# Patient Record
Sex: MALE | Race: WHITE | NOT HISPANIC OR LATINO | Employment: OTHER | URBAN - METROPOLITAN AREA
[De-identification: names, ages, dates, MRNs, and addresses within clinical notes are randomized per-mention and may not be internally consistent; named-entity substitution may affect disease eponyms.]

---

## 2021-02-08 ENCOUNTER — APPOINTMENT (OUTPATIENT)
Dept: VACCINE CLINIC | Facility: HOSPITAL | Age: 71
End: 2021-02-08

## 2021-03-09 DIAGNOSIS — Z23 IMMUNIZATION DUE: ICD-10-CM

## 2022-02-21 ENCOUNTER — TELEPHONE (OUTPATIENT)
Dept: INTERNAL MEDICINE | Facility: CLINIC | Age: 72
End: 2022-02-21

## 2022-02-21 NOTE — TELEPHONE ENCOUNTER
Caller: Marcus Sandoval    Relationship to patient: Self    Best call back number: [phone] (H)    Patient is needing: PATIENT CALLING IN REGARDS TO HAVING A NEW PATIENT APPOINTMENT FOR 05/27/22 PATIENT WOULD LIKE NEW PATIENT PACKET MAILED TO HOME

## 2022-05-27 ENCOUNTER — OFFICE VISIT (OUTPATIENT)
Dept: INTERNAL MEDICINE | Facility: CLINIC | Age: 72
End: 2022-05-27

## 2022-05-27 ENCOUNTER — PATIENT ROUNDING (BHMG ONLY) (OUTPATIENT)
Dept: INTERNAL MEDICINE | Facility: CLINIC | Age: 72
End: 2022-05-27

## 2022-05-27 VITALS
HEIGHT: 72 IN | TEMPERATURE: 98.2 F | SYSTOLIC BLOOD PRESSURE: 122 MMHG | BODY MASS INDEX: 28.71 KG/M2 | HEART RATE: 85 BPM | WEIGHT: 212 LBS | OXYGEN SATURATION: 97 % | DIASTOLIC BLOOD PRESSURE: 70 MMHG

## 2022-05-27 DIAGNOSIS — N18.31 STAGE 3A CHRONIC KIDNEY DISEASE: ICD-10-CM

## 2022-05-27 DIAGNOSIS — M1A.0790 CHRONIC IDIOPATHIC GOUT OF FOOT AND ANKLE REGION WITHOUT TOPHUS: ICD-10-CM

## 2022-05-27 DIAGNOSIS — Z11.59 NEED FOR HEPATITIS C SCREENING TEST: ICD-10-CM

## 2022-05-27 DIAGNOSIS — Z12.11 SCREENING FOR COLON CANCER: ICD-10-CM

## 2022-05-27 DIAGNOSIS — E78.2 MIXED HYPERLIPIDEMIA: ICD-10-CM

## 2022-05-27 DIAGNOSIS — N40.0 BPH WITH ELEVATED PSA: ICD-10-CM

## 2022-05-27 DIAGNOSIS — I10 ESSENTIAL HYPERTENSION: Primary | ICD-10-CM

## 2022-05-27 DIAGNOSIS — R97.20 BPH WITH ELEVATED PSA: ICD-10-CM

## 2022-05-27 PROBLEM — C44.92 SQUAMOUS CELL SKIN CANCER: Status: ACTIVE | Noted: 2022-05-27

## 2022-05-27 PROBLEM — Z90.5 HISTORY OF NEPHRECTOMY: Status: ACTIVE | Noted: 2022-03-25

## 2022-05-27 PROBLEM — C44.41 BASAL CELL CARCINOMA (BCC) OF SKIN OF SCALP AND NECK: Status: ACTIVE | Noted: 2022-05-27

## 2022-05-27 PROCEDURE — 99214 OFFICE O/P EST MOD 30 MIN: CPT | Performed by: FAMILY MEDICINE

## 2022-05-27 RX ORDER — ROSUVASTATIN CALCIUM 10 MG/1
10 TABLET, COATED ORAL DAILY
Qty: 90 TABLET | Refills: 1 | Status: SHIPPED | OUTPATIENT
Start: 2022-05-27 | End: 2022-11-16 | Stop reason: SDUPTHER

## 2022-05-27 RX ORDER — ALLOPURINOL 300 MG/1
TABLET ORAL
COMMUNITY
Start: 2022-05-11

## 2022-05-27 RX ORDER — FINASTERIDE 5 MG/1
1 TABLET, FILM COATED ORAL DAILY
COMMUNITY
End: 2022-05-27 | Stop reason: SDUPTHER

## 2022-05-27 RX ORDER — COLCHICINE 0.6 MG/1
TABLET ORAL
COMMUNITY
Start: 2021-12-09 | End: 2022-11-16

## 2022-05-27 RX ORDER — ROSUVASTATIN CALCIUM 10 MG/1
1 TABLET, COATED ORAL DAILY
COMMUNITY
End: 2022-05-27 | Stop reason: SDUPTHER

## 2022-05-27 RX ORDER — ASPIRIN 81 MG/1
81 TABLET ORAL DAILY
COMMUNITY
End: 2022-07-01

## 2022-05-27 RX ORDER — ENALAPRIL MALEATE 20 MG/1
10 TABLET ORAL DAILY
Qty: 90 TABLET | Refills: 1 | Status: SHIPPED | OUTPATIENT
Start: 2022-05-27 | End: 2022-11-16

## 2022-05-27 NOTE — PROGRESS NOTES
Subjective   Marcus Sandoval is a 72 y.o. male.   Chief Complaint   Patient presents with   • Hypertension   • Hyperlipidemia       History of Present Illness     This is a new patient in our office.    1.hypertension- diagnosed around age 26.  He is on enalapril 10 mg a day.  He takes half tablet every day.  He has no side effects.  He has no chest pain, no shortness of breath, no lightheadedness, no palpitations.  He exercises regularly.  He exercise on treadmill every other day in wintertime, he also stays physically active taking care of his property.    He has a history of bradycardia in 2020.  Had a stress test, echo and event monitor.  Work-up was negative.  Symptoms resolved after dose of enalapril was decreased from 20 to 10 mg a day.  He has no personal history of coronary artery disease.  He does not use tobacco products.  He never did.  No family history of heart disease.    2.  Hyperlipidemia- diagnosed years ago.  He is on Crestor 10 mg a day.  He takes it every day.  He has no side effects.  No muscle aches, no muscle cramps.    Chronic kidney disease- diagnosed after right-sided nephrectomy.  He has a solitary kidney.  He was diagnosed with urothelial cancer in 2016, was treated with right nephrouretrectomy and chemotherapy.  Baseline creatinine is between 1.5 and 2.0.  He follows up with nephrologist.  No blood in urine.  He had 1 episode of kidney stone in the past.    BPH - he has a history of PSA elevation.  He follows up with urologist twice a year.  He had biopsy twice which was negative.  He had CT scans which were negative.  He is on Proscar at 5 mg a day.    Gout- he follows up with Dr. Arana. He is on allopurinol 300 mg a day and colchicine. Symptoms are controlled.    Right-sided hernia localized in right lower abdomen.  He says that it was noted on CT.  He was evaluated by his urologist.  No abnormalities on exam.  Sometimes he gets pain in that area especially after picking up heavy  logs.    He has a history of basal cell skin cancer and squamous cell skin cancer.  He follows up with dermatologist every 6 months.    He has never had colonoscopy for colon cancer screening.  No family history of colon cancer.    The following portions of the patient's history were reviewed and updated as appropriate: allergies, current medications, past family history, past medical history, past social history, past surgical history and problem list.    Review of Systems   Respiratory: Negative for shortness of breath.    Cardiovascular: Negative for chest pain and palpitations.   Genitourinary: Negative for hematuria.   Neurological: Negative for light-headedness.         Objective   Wt Readings from Last 3 Encounters:   05/27/22 96.2 kg (212 lb)   11/18/21 93.4 kg (206 lb)   01/29/21 95.3 kg (210 lb)      Vitals:    05/27/22 0811   BP: 122/70   Pulse: 85   Temp: 98.2 °F (36.8 °C)   SpO2: 97%     Temp Readings from Last 3 Encounters:   05/27/22 98.2 °F (36.8 °C)   11/18/21 97.9 °F (36.6 °C) (Oral)   01/29/21 96.4 °F (35.8 °C) (Temporal)     BP Readings from Last 3 Encounters:   05/27/22 122/70   11/18/21 116/82   01/29/21 124/82     Pulse Readings from Last 3 Encounters:   05/27/22 85   11/18/21 88   01/29/21 56     Body mass index is 28.75 kg/m².    Physical Exam  Constitutional:       Appearance: He is well-developed.   HENT:      Head: Normocephalic and atraumatic.   Neck:      Thyroid: No thyromegaly.      Vascular: No carotid bruit.   Cardiovascular:      Rate and Rhythm: Normal rate and regular rhythm.      Heart sounds: Normal heart sounds.   Pulmonary:      Effort: Pulmonary effort is normal.      Breath sounds: Normal breath sounds.   Abdominal:      General: There is no distension.      Palpations: Abdomen is soft. There is no mass.      Tenderness: There is no abdominal tenderness.      Hernia: No hernia is present.   Musculoskeletal:      Cervical back: Neck supple.   Skin:     General: Skin is warm  and dry.   Neurological:      Mental Status: He is alert and oriented to person, place, and time.   Psychiatric:         Behavior: Behavior normal.         Assessment & Plan   Diagnoses and all orders for this visit:    1. Essential hypertension (Primary)  -     Comprehensive Metabolic Panel    2. Stage 3a chronic kidney disease (HCC)  -     CBC (No Diff)  -     Comprehensive Metabolic Panel  -     Vitamin D 25 Hydroxy    3. Mixed hyperlipidemia  -     Comprehensive Metabolic Panel  -     Lipid Panel With LDL / HDL Ratio    4. Screening for colon cancer  -     Ambulatory Referral to Gastroenterology    5. Need for hepatitis C screening test  -     Hepatitis C Antibody    6. 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 BPH with elevated PSA    Other orders  -     rosuvastatin (CRESTOR) 10 MG tablet; Take 1 tablet by mouth Daily.  Dispense: 90 tablet; Refill: 1  -     enalapril (VASOTEC) 20 MG tablet; Take 0.5 tablets by mouth Daily.  Dispense: 90 tablet; Refill: 1        1.  Hypertension- we are checking labs.  We will continue current treatment.  I am advising patient against using baby aspirin.  He currently uses it once a week.  No benefits over risks.  He is going to stop it.  Follow-up in 6 months.    2. Hyperlipidemia-check labs.  Continue current treatment.  Follow-up in 6 months.    3. Chronic kidney disease-we are checking labs.  He will continue to avoid NSAIDs and will follow-up with his nephrologist as scheduled.    4. Gout- followed up by rheumatologist.    5.BPH-followed by urologist.      Referral to GI for colon cancer screening done.

## 2022-05-27 NOTE — PROGRESS NOTES
May 27, 2022    PATIENT ROUNDING OBTAINED AT CHECK OUT.      HE SAID HE HEARD ABOUT DR. UREÑA FROM HIS DAUGHTER WHO IS A PATIENT HERE.      HE STATES THERE WASN'T A HUGE ISSUE MAKING HIS NEW PATIENT APPOINTMENT, BUT HE HAD ISSUES TRYING TO CHECK IN ON LINE.  FOR INSTANCE, ON WHAT SOUNDS LIKE THE CONSENT FORM, HE SAID THERE WAS NO WAY TO DO AN ELECTRONIC SIGNATURE.  OTHER THAN THAT, HE HAD A GOOD EXPERIENCE TODAY.

## 2022-05-28 LAB
25(OH)D3+25(OH)D2 SERPL-MCNC: 51 NG/ML (ref 30–100)
ALBUMIN SERPL-MCNC: 4.9 G/DL (ref 3.7–4.7)
ALBUMIN/GLOB SERPL: 2.6 {RATIO} (ref 1.2–2.2)
ALP SERPL-CCNC: 75 IU/L (ref 44–121)
ALT SERPL-CCNC: 23 IU/L (ref 0–44)
AST SERPL-CCNC: 28 IU/L (ref 0–40)
BILIRUB SERPL-MCNC: 0.8 MG/DL (ref 0–1.2)
BUN SERPL-MCNC: 23 MG/DL (ref 8–27)
BUN/CREAT SERPL: 13 (ref 10–24)
CALCIUM SERPL-MCNC: 9.8 MG/DL (ref 8.6–10.2)
CHLORIDE SERPL-SCNC: 103 MMOL/L (ref 96–106)
CHOLEST SERPL-MCNC: 126 MG/DL (ref 100–199)
CO2 SERPL-SCNC: 20 MMOL/L (ref 20–29)
CREAT SERPL-MCNC: 1.77 MG/DL (ref 0.76–1.27)
EGFRCR SERPLBLD CKD-EPI 2021: 40 ML/MIN/1.73
ERYTHROCYTE [DISTWIDTH] IN BLOOD BY AUTOMATED COUNT: 12.3 % (ref 11.6–15.4)
GLOBULIN SER CALC-MCNC: 1.9 G/DL (ref 1.5–4.5)
GLUCOSE SERPL-MCNC: 90 MG/DL (ref 65–99)
HCT VFR BLD AUTO: 50.4 % (ref 37.5–51)
HCV AB S/CO SERPL IA: <0.1 S/CO RATIO (ref 0–0.9)
HDLC SERPL-MCNC: 45 MG/DL
HGB BLD-MCNC: 17.2 G/DL (ref 13–17.7)
LDLC SERPL CALC-MCNC: 51 MG/DL (ref 0–99)
LDLC/HDLC SERPL: 1.1 RATIO (ref 0–3.6)
MCH RBC QN AUTO: 31.4 PG (ref 26.6–33)
MCHC RBC AUTO-ENTMCNC: 34.1 G/DL (ref 31.5–35.7)
MCV RBC AUTO: 92 FL (ref 79–97)
PLATELET # BLD AUTO: 217 X10E3/UL (ref 150–450)
POTASSIUM SERPL-SCNC: 5.1 MMOL/L (ref 3.5–5.2)
PROT SERPL-MCNC: 6.8 G/DL (ref 6–8.5)
RBC # BLD AUTO: 5.48 X10E6/UL (ref 4.14–5.8)
SODIUM SERPL-SCNC: 141 MMOL/L (ref 134–144)
TRIGL SERPL-MCNC: 183 MG/DL (ref 0–149)
VLDLC SERPL CALC-MCNC: 30 MG/DL (ref 5–40)
WBC # BLD AUTO: 8 X10E3/UL (ref 3.4–10.8)

## 2022-06-01 ENCOUNTER — TELEPHONE (OUTPATIENT)
Dept: GASTROENTEROLOGY | Facility: CLINIC | Age: 72
End: 2022-06-01

## 2022-06-01 NOTE — TELEPHONE ENCOUNTER
Received referral through [facility]. Called and spoke with patient.  He requested call back.

## 2022-06-22 ENCOUNTER — PRE-PROCEDURE SCREENING (OUTPATIENT)
Dept: GASTROENTEROLOGY | Facility: CLINIC | Age: 72
End: 2022-06-22

## 2022-06-30 ENCOUNTER — TELEPHONE (OUTPATIENT)
Dept: INTERNAL MEDICINE | Facility: CLINIC | Age: 72
End: 2022-06-30

## 2022-06-30 NOTE — TELEPHONE ENCOUNTER
Caller: Marcus Sandoval    Relationship: Self    Best call back number: [phone]    What is the best time to reach you: ANYTIME    Who are you requesting to speak with (clinical staff, provider,  specific staff member): PCP OR MEDICAL ASSISTANT     Do you know the name of the person who called: SELF    What was the call regarding: THE PATIENT STATES THAT HE HAS BEEN EXPERIENCING INSTANCES WHERE HE FEELS AS THOUGH HE WILL PASS OUT. THE PATIENT CHECKED HIS BLOOD PRESSURE THE LAST TIME THIS HAPPENED AND HE STATES THAT THE RESULTS WERE AROUND -190/90 RANGE. THE PATIENT WOULD LIKE TO KNOW WHAT TO DO FROM HERE - HE STATES THAT HIS PULSE IS ALSO IN THE 40'S.    Do you require a callback: YES, PLEASE

## 2022-07-01 ENCOUNTER — OFFICE VISIT (OUTPATIENT)
Dept: INTERNAL MEDICINE | Facility: CLINIC | Age: 72
End: 2022-07-01

## 2022-07-01 VITALS
DIASTOLIC BLOOD PRESSURE: 52 MMHG | SYSTOLIC BLOOD PRESSURE: 104 MMHG | BODY MASS INDEX: 29.26 KG/M2 | OXYGEN SATURATION: 97 % | WEIGHT: 216 LBS | HEART RATE: 45 BPM | HEIGHT: 72 IN | TEMPERATURE: 97.3 F

## 2022-07-01 DIAGNOSIS — I10 ESSENTIAL HYPERTENSION: Primary | ICD-10-CM

## 2022-07-01 DIAGNOSIS — R42 DIZZINESS: ICD-10-CM

## 2022-07-01 DIAGNOSIS — R00.1 BRADYCARDIA: ICD-10-CM

## 2022-07-01 PROCEDURE — 93000 ELECTROCARDIOGRAM COMPLETE: CPT | Performed by: FAMILY MEDICINE

## 2022-07-01 PROCEDURE — 99213 OFFICE O/P EST LOW 20 MIN: CPT | Performed by: FAMILY MEDICINE

## 2022-07-01 NOTE — PROGRESS NOTES
Subjective   Marcus Sandoval is a 72 y.o. male.   Chief Complaint   Patient presents with   • Hypertension   • Dizziness       History of Present Illness     1.  Dizziness-2 days ago patient was sitting outdoors and fell sudden onset of dizziness, combination of spinning sensation and lightheadedness.  He felt like he was going to pass out.  He had no other symptoms associated with it.  No chest pain, no nausea, no shortness of breath, no palpitations.  In the past he felt the same way when his blood pressure was low, so this time he drank coffee and felt better.  Next morning he had same sensation, he checked blood pressure and it was in 180s over 90s to 100.  He checked it 3 times in 15 minutes.  He realized that he skipped his night dose of enalapril( 10 mg), so he took double dose ( 20 mg)in the morning.   He did not have similar sensation since then.  No new medications including over the counter medications.     He has a history of bradycardia in 2020.  Had a stress test, echo and event monitor.  Work-up was negative.  Symptoms resolved after dose of enalapril was decreased from 20 twice a day to 10 mg a twice at day.  He has no personal history of coronary artery disease.  He does not use tobacco products.  He never did.  No family history of heart disease.    The following portions of the patient's history were reviewed and updated as appropriate: allergies, current medications, past family history, past medical history, past social history, past surgical history and problem list.    Review of Systems   Respiratory: Negative for shortness of breath.    Cardiovascular: Negative for chest pain and palpitations.   Gastrointestinal: Negative for nausea.   Musculoskeletal: Negative for neck pain.   Neurological: Positive for dizziness. Negative for headaches.         Objective   Wt Readings from Last 3 Encounters:   07/01/22 98 kg (216 lb)   05/27/22 96.2 kg (212 lb)   11/18/21 93.4 kg (206 lb)      Vitals:     07/01/22 0820   BP: 104/52   Pulse: (!) 45   Temp: 97.3 °F (36.3 °C)   SpO2: 97%     Temp Readings from Last 3 Encounters:   07/01/22 97.3 °F (36.3 °C)   05/27/22 98.2 °F (36.8 °C)   11/18/21 97.9 °F (36.6 °C) (Oral)     BP Readings from Last 3 Encounters:   07/01/22 104/52   05/27/22 122/70   11/18/21 116/82     Pulse Readings from Last 3 Encounters:   07/01/22 (!) 45   05/27/22 85   11/18/21 88     Body mass index is 29.29 kg/m².    Physical Exam  Constitutional:       Appearance: He is well-developed.   HENT:      Head: Normocephalic and atraumatic.   Neck:      Thyroid: No thyromegaly.      Vascular: No carotid bruit.   Cardiovascular:      Rate and Rhythm: Regular rhythm. Bradycardia present.      Heart sounds: Normal heart sounds.   Pulmonary:      Effort: Pulmonary effort is normal.      Breath sounds: Normal breath sounds.   Musculoskeletal:      Cervical back: Neck supple.   Skin:     General: Skin is warm and dry.   Neurological:      Mental Status: He is alert and oriented to person, place, and time.   Psychiatric:         Behavior: Behavior normal.         Assessment & Plan   Diagnoses and all orders for this visit:    1. Essential hypertension (Primary)    2. Bradycardia    3. Dizziness        1.  Hypertension/2.  Bradycardia/3.dizziness-EKG in the office today.  He took higher dose of enalapril in the past it was causing bradycardia.  I recommend evaluation by cardiologist.  He will call to schedule it.  If he becomes symptomatic again he needs to go to ER.

## 2022-08-18 ENCOUNTER — OFFICE VISIT (OUTPATIENT)
Dept: CARDIOLOGY | Facility: CLINIC | Age: 72
End: 2022-08-18

## 2022-08-18 VITALS
WEIGHT: 218 LBS | OXYGEN SATURATION: 99 % | SYSTOLIC BLOOD PRESSURE: 126 MMHG | HEART RATE: 76 BPM | BODY MASS INDEX: 29.56 KG/M2 | RESPIRATION RATE: 16 BRPM | DIASTOLIC BLOOD PRESSURE: 80 MMHG

## 2022-08-18 DIAGNOSIS — I10 PRIMARY HYPERTENSION: ICD-10-CM

## 2022-08-18 DIAGNOSIS — E78.2 MIXED HYPERLIPIDEMIA: ICD-10-CM

## 2022-08-18 DIAGNOSIS — I49.3 PVC (PREMATURE VENTRICULAR CONTRACTION): Primary | ICD-10-CM

## 2022-08-18 PROCEDURE — 99204 OFFICE O/P NEW MOD 45 MIN: CPT | Performed by: INTERNAL MEDICINE

## 2022-08-18 PROCEDURE — 93000 ELECTROCARDIOGRAM COMPLETE: CPT | Performed by: INTERNAL MEDICINE

## 2022-08-18 NOTE — PROGRESS NOTES
CARDIOLOGY    Evi Rowell MD    ENCOUNTER DATE:  08/18/2022    Marcus Sandoval / 72 y.o. / male        CHIEF COMPLAINT / REASON FOR OFFICE VISIT     Heart Problem (New patient recently  had a cardiac ablation /Bradycardia & dizziness )      HISTORY OF PRESENT ILLNESS       HPI    Marcus Sandoval is a 72 y.o. male     This gentleman has hypertension and hyperlipidemia. He was diagnosed with frequent monofocal premature ventricular contractions. He was seen at [facility]. I have reviewed their reports and see that in 07/2022 he wore a 48 hour Holter monitor which showed a PVC burden of 34%. Carotid Doppler 07/2022 showed some nonobstructive bilateral carotid artery disease. Stress echo 07/2022 showed normal LV function. No significant valve disease. No evidence of ischemia. On 08/09/2022, he had an ablation for PVC's performed at the [facility].    He comes in to establish care with a local cardiologist. He is feeling well. He mows his grass. He has 3 acres and is active in taking care of his property. He denies palpitations, shortness of breath, edema, lightheadedness, chest pain, or fatigue.        REVIEW OF SYSTEMS     Review of Systems   Constitutional: Negative for chills, fever, weight gain and weight loss.   Cardiovascular: Negative for leg swelling.   Respiratory: Negative for cough, snoring and wheezing.    Hematologic/Lymphatic: Negative for bleeding problem. Does not bruise/bleed easily.   Skin: Negative for color change.   Musculoskeletal: Negative for falls, joint pain and myalgias.   Gastrointestinal: Negative for melena.   Genitourinary: Negative for hematuria.   Neurological: Negative for excessive daytime sleepiness.   Psychiatric/Behavioral: Negative for depression. The patient is not nervous/anxious.          VITAL SIGNS     Visit Vitals  /80 (BP Location: Right arm, Patient Position: Sitting, Cuff Size: Adult)   Pulse 76   Resp 16   Wt 98.9 kg (218 lb)   SpO2 99%   BMI  29.56 kg/m²         Wt Readings from Last 3 Encounters:   08/18/22 98.9 kg (218 lb)   07/01/22 98 kg (216 lb)   05/27/22 96.2 kg (212 lb)     Body mass index is 29.56 kg/m².      PHYSICAL EXAMINATION     Constitutional:       General: Not in acute distress.  Neck:      Vascular: No carotid bruit or JVD.   Pulmonary:      Effort: Pulmonary effort is normal.      Breath sounds: Normal breath sounds.   Cardiovascular:      Normal rate. Regular rhythm.      Murmurs: There is no murmur.   Psychiatric:         Mood and Affect: Mood and affect normal.           REVIEWED DATA       ECG 12 Lead    Date/Time: 8/18/2022 11:17 AM  Performed by: Evi Rowell MD  Authorized by: Evi Rowell MD   Comparison: compared with previous ECG from 7/1/2022  Comparison to previous ECG: No longer having premature ventricular contractions  Rhythm: sinus rhythm  BPM: 76  Conduction: conduction normal  ST Segments: ST segments normal  T Waves: T waves normal    Clinical impression: normal ECG              Lipid Panel    Lipid Panel 5/27/22   Total Cholesterol 126   Triglycerides 183 (A)   HDL Cholesterol 45   VLDL Cholesterol 30   LDL Cholesterol  51   LDL/HDL Ratio 1.1   (A) Abnormal value       Comments are available for some flowsheets but are not being displayed.             Lab Results   Component Value Date    GLUCOSE 90 05/27/2022    BUN 23 05/27/2022    CREATININE 1.77 (H) 05/27/2022    BCR 13 05/27/2022    K 5.1 05/27/2022    CO2 20 05/27/2022    CALCIUM 9.8 05/27/2022    PROTENTOTREF 6.8 05/27/2022    ALBUMIN 4.9 (H) 05/27/2022    LABIL2 2.6 (H) 05/27/2022    AST 28 05/27/2022    ALT 23 05/27/2022       ASSESSMENT & PLAN      Diagnosis Plan   1. PVC (premature ventricular contraction)     2. Primary hypertension         1.  Frequent premature ventricular contractions, status post ablation at [facility] in 08/2022. EKG today is normal without PVC's.   2.  Hypertension. His blood pressure looks normal today.  3.   Hyperlipidemia. On rosuvastatin. I reviewed his most recent laboratory data.    Overall he appears to be stable and doing well after his PVC ablation. He will continue to follow with the [facility], but I will see him here locally in case he has any issues. I will plan on seeing him back in 3 months unless his symptoms change sooner.    He does have nonobstructive carotid artery disease.  Monitor.    Orders Placed This Encounter   Procedures   • ECG 12 Lead     This order was created via procedure documentation     Order Specific Question:   Release to patient     Answer:   Routine Release           MEDICATIONS         Discharge Medications          Accurate as of August 18, 2022 11:17 AM. If you have any questions, ask your nurse or doctor.            Continue These Medications      Instructions Start Date   allopurinol 300 MG tablet  Commonly known as: ZYLOPRIM   No dose, route, or frequency recorded.      Coenzyme Q10 10 MG capsule   Oral, Daily      Coenzyme Q10 10 MG capsule   10 mg, Oral, Daily      colchicine 0.6 MG tablet   1 po qd.  During Gout flare take 2 tabs initially then 1 an hour later then resume 1 po qd thereafter      enalapril 20 MG tablet  Commonly known as: VASOTEC   10 mg, Oral, Daily      finasteride 5 MG tablet  Commonly known as: PROSCAR   No dose, route, or frequency recorded.      OMEGA-3-6-9 PO   Oral      rosuvastatin 10 MG tablet  Commonly known as: CRESTOR   10 mg, Oral, Daily               Evi Rowell MD  08/18/22  11:17 EDT    **Vero Disclaimer:   Much of this encounter note is an electronic transcription/translation of spoken language to printed text. The electronic translation of spoken language may permit erroneous, or at times, nonsensical words or phrases to be inadvertently transcribed. Although I have reviewed the note for such errors, some may still exist.

## 2022-11-09 DIAGNOSIS — I10 ESSENTIAL HYPERTENSION: Primary | ICD-10-CM

## 2022-11-09 DIAGNOSIS — E78.2 MIXED HYPERLIPIDEMIA: ICD-10-CM

## 2022-11-12 LAB
ALBUMIN SERPL-MCNC: 4.4 G/DL (ref 3.5–5.2)
ALBUMIN/GLOB SERPL: 2.3 G/DL
ALP SERPL-CCNC: 81 U/L (ref 39–117)
ALT SERPL-CCNC: 18 U/L (ref 1–41)
AST SERPL-CCNC: 16 U/L (ref 1–40)
BILIRUB SERPL-MCNC: 0.5 MG/DL (ref 0–1.2)
BUN SERPL-MCNC: 25 MG/DL (ref 8–23)
BUN/CREAT SERPL: 14.4 (ref 7–25)
CALCIUM SERPL-MCNC: 9.5 MG/DL (ref 8.6–10.5)
CHLORIDE SERPL-SCNC: 105 MMOL/L (ref 98–107)
CHOLEST SERPL-MCNC: 119 MG/DL (ref 0–200)
CO2 SERPL-SCNC: 25.8 MMOL/L (ref 22–29)
CREAT SERPL-MCNC: 1.74 MG/DL (ref 0.76–1.27)
EGFRCR SERPLBLD CKD-EPI 2021: 40.6 ML/MIN/1.73
GLOBULIN SER CALC-MCNC: 1.9 GM/DL
GLUCOSE SERPL-MCNC: 90 MG/DL (ref 65–99)
HDLC SERPL-MCNC: 48 MG/DL (ref 40–60)
LDLC SERPL CALC-MCNC: 48 MG/DL (ref 0–100)
LDLC/HDLC SERPL: 0.95 {RATIO}
POTASSIUM SERPL-SCNC: 4.5 MMOL/L (ref 3.5–5.2)
PROT SERPL-MCNC: 6.3 G/DL (ref 6–8.5)
SODIUM SERPL-SCNC: 141 MMOL/L (ref 136–145)
TRIGL SERPL-MCNC: 128 MG/DL (ref 0–150)
URATE SERPL-MCNC: 4.1 MG/DL (ref 3.4–7)
VLDLC SERPL CALC-MCNC: 23 MG/DL (ref 5–40)

## 2022-11-16 ENCOUNTER — OFFICE VISIT (OUTPATIENT)
Dept: INTERNAL MEDICINE | Facility: CLINIC | Age: 72
End: 2022-11-16

## 2022-11-16 VITALS
BODY MASS INDEX: 29.12 KG/M2 | TEMPERATURE: 98 F | HEIGHT: 72 IN | SYSTOLIC BLOOD PRESSURE: 130 MMHG | DIASTOLIC BLOOD PRESSURE: 80 MMHG | HEART RATE: 80 BPM | OXYGEN SATURATION: 95 % | WEIGHT: 215 LBS

## 2022-11-16 DIAGNOSIS — E78.2 MIXED HYPERLIPIDEMIA: ICD-10-CM

## 2022-11-16 DIAGNOSIS — I10 ESSENTIAL HYPERTENSION: Primary | ICD-10-CM

## 2022-11-16 PROCEDURE — 99214 OFFICE O/P EST MOD 30 MIN: CPT | Performed by: FAMILY MEDICINE

## 2022-11-16 RX ORDER — ENALAPRIL MALEATE 20 MG/1
20 TABLET ORAL 2 TIMES DAILY
COMMUNITY

## 2022-11-16 RX ORDER — ROSUVASTATIN CALCIUM 10 MG/1
10 TABLET, COATED ORAL DAILY
Qty: 90 TABLET | Refills: 1 | Status: SHIPPED | OUTPATIENT
Start: 2022-11-16 | End: 2022-11-23 | Stop reason: SDUPTHER

## 2022-11-16 NOTE — PROGRESS NOTES
Subjective   Marcus Sandoval is a 72 y.o. male.   Chief Complaint   Patient presents with   • Hyperlipidemia   • Hypertension       History of Present Illness     1. HTN- Patient was evaluated at the [facility] in July 2022 for bradycardia and frequent PVCs.  He had extensive work-up done.  He was treated with ablation.  It was successful.  He feels much better after it was done.  He did not have PVCs since then.  No lightheadedness, no chest pain, no shortness of breath, no palpitations.  He continues to take enalapril at 10 mg twice a day.  Blood pressure stays in 117-129/79-86, heart rate mostly in 70s.  He has more energy.  He continues to follow-up with nephrologist on chronic kidney disease.  Creatinine 1.7, GFR 40.  Avoids NSAIDs.    2.  Hyperlipidemia-on Crestor at 10 mg a day.  She takes it every day.  No side effects.  LDL 48 from 51, HDL 48, LFTs normal.    The following portions of the patient's history were reviewed and updated as appropriate: allergies, current medications, past family history, past medical history, past social history, past surgical history and problem list.    Review of Systems   Constitutional: Negative.    Respiratory: Negative.  Negative for shortness of breath.    Cardiovascular: Negative for chest pain and palpitations.   Neurological: Negative for light-headedness.         Objective   Wt Readings from Last 3 Encounters:   11/16/22 97.5 kg (215 lb)   08/18/22 98.9 kg (218 lb)   07/01/22 98 kg (216 lb)      Vitals:    11/16/22 0954   BP: 130/80   Pulse: 80   Temp: 98 °F (36.7 °C)   SpO2: 95%     Temp Readings from Last 3 Encounters:   11/16/22 98 °F (36.7 °C)   07/01/22 97.3 °F (36.3 °C)   05/27/22 98.2 °F (36.8 °C)     BP Readings from Last 3 Encounters:   11/16/22 130/80   08/18/22 126/80   07/01/22 104/52     Pulse Readings from Last 3 Encounters:   11/16/22 80   08/18/22 76   07/01/22 (!) 45     Body mass index is 29.15 kg/m².    Physical Exam  Constitutional:        Appearance: He is well-developed.   Neck:      Thyroid: No thyromegaly.      Vascular: No carotid bruit.   Cardiovascular:      Rate and Rhythm: Normal rate and regular rhythm.      Heart sounds: Normal heart sounds.   Pulmonary:      Effort: Pulmonary effort is normal.      Breath sounds: Normal breath sounds.   Musculoskeletal:      Cervical back: Neck supple.   Skin:     General: Skin is warm and dry.   Neurological:      Mental Status: He is alert and oriented to person, place, and time.   Psychiatric:         Behavior: Behavior normal.         Assessment & Plan   Diagnoses and all orders for this visit:    1. Essential hypertension (Primary)    2. Mixed hyperlipidemia    Other orders  -     rosuvastatin (CRESTOR) 10 MG tablet; Take 1 tablet by mouth Daily.  Dispense: 90 tablet; Refill: 1        1. Hypertension-continue current treatment.  Follow-up in 6 months.  2.  Hyperlipidemia-continue current treatment.  Follow-up in 6 months.    Patient is reminded to schedule colonoscopy. He is due.

## 2022-11-22 ENCOUNTER — OFFICE VISIT (OUTPATIENT)
Dept: CARDIOLOGY | Facility: CLINIC | Age: 72
End: 2022-11-22

## 2022-11-22 VITALS
RESPIRATION RATE: 16 BRPM | SYSTOLIC BLOOD PRESSURE: 128 MMHG | HEIGHT: 72 IN | OXYGEN SATURATION: 99 % | DIASTOLIC BLOOD PRESSURE: 80 MMHG | WEIGHT: 210 LBS | HEART RATE: 87 BPM | BODY MASS INDEX: 28.44 KG/M2

## 2022-11-22 DIAGNOSIS — I65.23 BILATERAL CAROTID ARTERY STENOSIS: ICD-10-CM

## 2022-11-22 DIAGNOSIS — I49.3 PVC (PREMATURE VENTRICULAR CONTRACTION): Primary | ICD-10-CM

## 2022-11-22 DIAGNOSIS — N18.30 STAGE 3 CHRONIC KIDNEY DISEASE, UNSPECIFIED WHETHER STAGE 3A OR 3B CKD: ICD-10-CM

## 2022-11-22 DIAGNOSIS — I10 PRIMARY HYPERTENSION: ICD-10-CM

## 2022-11-22 DIAGNOSIS — E78.2 MIXED HYPERLIPIDEMIA: ICD-10-CM

## 2022-11-22 PROCEDURE — 99214 OFFICE O/P EST MOD 30 MIN: CPT | Performed by: INTERNAL MEDICINE

## 2022-11-22 PROCEDURE — 93000 ELECTROCARDIOGRAM COMPLETE: CPT | Performed by: INTERNAL MEDICINE

## 2022-11-22 NOTE — PROGRESS NOTES
"      CARDIOLOGY    Evi Rowell MD    ENCOUNTER DATE:  11/22/2022    Marcus Sandoval / 72 y.o. / male        CHIEF COMPLAINT / REASON FOR OFFICE VISIT     Heart Problem (3 Month follow up/PVC )      HISTORY OF PRESENT ILLNESS       HPI    Marcus Sandoval is a 72 y.o. male     This gentleman has hypertension and hyperlipidemia. He was diagnosed with frequent monofocal premature ventricular contractions. He was seen at [facility]. I have reviewed their reports and see that in 07/2022 he wore a 48 hour Holter monitor which showed a PVC burden of 34%. Carotid Doppler 07/2022 showed some nonobstructive bilateral carotid artery disease. Stress echo 07/2022 showed normal LV function. No significant valve disease. No evidence of ischemia. On 08/09/2022, he had an ablation for PVC's performed at the [facility].    Remains active taking care of his 3 acres of land and.  He had carotid Doppler at the [facility] which I reviewed and she has some mild bilateral carotid artery stenosis and may be some disease in the vertebral arteries but they were not well visualized.  No stroke symptoms.    REVIEW OF SYSTEMS     Review of Systems   Constitutional: Negative for chills, fever, weight gain and weight loss.   Cardiovascular: Negative for leg swelling.   Respiratory: Negative for cough, snoring and wheezing.    Hematologic/Lymphatic: Negative for bleeding problem. Does not bruise/bleed easily.   Skin: Negative for color change.   Musculoskeletal: Negative for falls, joint pain and myalgias.   Gastrointestinal: Negative for melena.   Genitourinary: Negative for hematuria.   Neurological: Negative for excessive daytime sleepiness.   Psychiatric/Behavioral: Negative for depression. The patient is not nervous/anxious.          VITAL SIGNS     Visit Vitals  /80 (BP Location: Right arm, Patient Position: Sitting, Cuff Size: Adult)   Pulse 87   Resp 16   Ht 182.9 cm (72\")   Wt 95.3 kg (210 lb)   SpO2 99%   BMI " 28.48 kg/m²         Wt Readings from Last 3 Encounters:   11/22/22 95.3 kg (210 lb)   11/16/22 97.5 kg (215 lb)   08/18/22 98.9 kg (218 lb)     Body mass index is 28.48 kg/m².      PHYSICAL EXAMINATION     Constitutional:       General: Not in acute distress.  Neck:      Vascular: No carotid bruit or JVD.   Pulmonary:      Effort: Pulmonary effort is normal.      Breath sounds: Normal breath sounds.   Cardiovascular:      Normal rate. Regular rhythm.      Murmurs: There is no murmur.   Psychiatric:         Mood and Affect: Mood and affect normal.           REVIEWED DATA       ECG 12 Lead    Date/Time: 11/22/2022 10:31 AM  Performed by: Evi Rowell MD  Authorized by: Evi Rowell MD   Comparison: compared with previous ECG from 8/18/2022  Similar to previous ECG  Rhythm: sinus rhythm  BPM: 87  Conduction: conduction normal  ST Segments: ST segments normal  T Waves: T waves normal    Clinical impression: normal ECG              Lipid Panel    Lipid Panel 5/27/22 11/11/22   Total Cholesterol 126 119   Triglycerides 183 (A) 128   HDL Cholesterol 45 48   VLDL Cholesterol 30 23   LDL Cholesterol  51 48   LDL/HDL Ratio 1.1 0.95   (A) Abnormal value       Comments are available for some flowsheets but are not being displayed.             Lab Results   Component Value Date    GLUCOSE 90 11/11/2022    BUN 25 (H) 11/11/2022    CREATININE 1.74 (H) 11/11/2022    BCR 14.4 11/11/2022    K 4.5 11/11/2022    CO2 25.8 11/11/2022    CALCIUM 9.5 11/11/2022    PROTENTOTREF 6.3 11/11/2022    ALBUMIN 4.40 11/11/2022    LABIL2 2.3 11/11/2022    AST 16 11/11/2022    ALT 18 11/11/2022       ASSESSMENT & PLAN      Diagnosis Plan   1. PVC (premature ventricular contraction)  MRI angiogram head w contrast    MRI Angiogram Neck With & Without Contrast      2. Primary hypertension  MRI angiogram head w contrast    MRI Angiogram Neck With & Without Contrast      3. Mixed hyperlipidemia  MRI angiogram head w contrast    MRI Angiogram  Neck With & Without Contrast      4. Bilateral carotid artery stenosis  MRI angiogram head w contrast    MRI Angiogram Neck With & Without Contrast      5. Stage 3 chronic kidney disease, unspecified whether stage 3a or 3b CKD (HCC)  MRI angiogram head w contrast    MRI Angiogram Neck With & Without Contrast          1.  Frequent premature ventricular contractions, status post ablation at [facility] in 08/2022. EKG today is normal without PVC's.   2.  Hypertension. His blood pressure looks normal today.  3.  Hyperlipidemia. On rosuvastatin. I reviewed his most recent laboratory data.  4.  Carotid artery stenosis.  We talked about doing a CTA but given his single kidney and chronic kidney disease I think an MRA of the head and neck is a better option for him.  One of my nurses or I will go over the results of it when it becomes available.      I will see him back in 6 months.      Orders Placed This Encounter   Procedures   • MRI angiogram head w contrast     Standing Status:   Future     Standing Expiration Date:   11/22/2023     Order Specific Question:   Possibility Surgery May Be Needed Based on Result of This Exam     Answer:   Yes   • MRI Angiogram Neck With & Without Contrast     Standing Status:   Future     Standing Expiration Date:   11/22/2023     Order Specific Question:   Possibility Surgery May Be Needed Based on Result of This Exam     Answer:   Yes   • ECG 12 Lead     This order was created via procedure documentation     Order Specific Question:   Release to patient     Answer:   Routine Release           MEDICATIONS         Discharge Medications          Accurate as of November 22, 2022 10:32 AM. If you have any questions, ask your nurse or doctor.            Continue These Medications      Instructions Start Date   allopurinol 300 MG tablet  Commonly known as: ZYLOPRIM   No dose, route, or frequency recorded.      Coenzyme Q10 10 MG capsule   Oral, Daily      enalapril 20 MG tablet  Commonly  known as: VASOTEC   20 mg, Oral, 2 Times Daily      finasteride 5 MG tablet  Commonly known as: PROSCAR   No dose, route, or frequency recorded.      rosuvastatin 10 MG tablet  Commonly known as: CRESTOR   10 mg, Oral, Daily               Evi Rowell MD  11/22/22  10:32 EST    Part of this note may be an electronic transcription/translation of spoken language to printed text using the Dragon dictation system.

## 2022-11-23 RX ORDER — ROSUVASTATIN CALCIUM 10 MG/1
10 TABLET, COATED ORAL DAILY
Qty: 90 TABLET | Refills: 1 | Status: SHIPPED | OUTPATIENT
Start: 2022-11-23

## 2022-11-23 NOTE — TELEPHONE ENCOUNTER
Caller: Marcus Sandoval    Relationship: Self    Requested Prescriptions:   Requested Prescriptions     Pending Prescriptions Disp Refills   • rosuvastatin (CRESTOR) 10 MG tablet 90 tablet 1     Sig: Take 1 tablet by mouth Daily.        Pharmacy where request should be sent: [facility] [id number] [facility] [address] [facility]  AT [address] [phone] [facility] [phone] FX     Additional details provided by patient: PATIENT HAS 2 WEEKS LEFT, AND WOULD LIKE A 90 DAY SUPPLY.      Does the patient have less than a 3 day supply:  [] Yes  [x] No    Fran Montiel Rep   11/23/22 11:56 EST

## 2023-05-01 RX ORDER — ROSUVASTATIN CALCIUM 10 MG/1
TABLET, COATED ORAL
Qty: 90 TABLET | Refills: 1 | Status: SHIPPED | OUTPATIENT
Start: 2023-05-01

## 2023-06-19 ENCOUNTER — TELEPHONE (OUTPATIENT)
Dept: CARDIOLOGY | Facility: CLINIC | Age: 73
End: 2023-06-19

## 2023-06-19 NOTE — TELEPHONE ENCOUNTER
Caller: Marcus Sandoval    Relationship: Self    Best call back number:[phone]    What is the best time to reach you:ANYTIME     Who are you requesting to speak with (clinical staff, provider,  specific staff member): CLINICAL        What was the call regarding: PT IN [facility], GRANDDAUGHTER IN HOSPITAL. PT HAD SLEEP APNEA STUDY AND IS NOW USING C-PAP MACHINE.DR BERMAN IS HIS DR FOR THAT.  IT IS SHOWING UP HE IS HAVING BRADYCARDIA AND HIS HEART RATE AT NIGHT IS 43-49.  COULD YOU PLEASE CALL TO DISCUSS. HE HAS VIRTUAL CONSULT ON 06/21/2023 AT 10:30. COULD YOU PLEASE CALL BEFORE THAT APPT.    Is it okay if the provider responds through [facility]: NO

## 2023-06-19 NOTE — TELEPHONE ENCOUNTER
Spoke to patient. He said that since February he has been using a CPAP for severe SHALINI. The did another pulse oximetry test 2 weeks ago to see how his oxygen levels improved with the use of a CPAP and there has been a 90% improvement, but it is showing that he is bradycardic with sleep. HR 44-48. However during the day is HR is between 70-90. He said he feels great. He was just concerned because last time he was having bradycardia it was because he was having frequent PVCs. I explained to him that it is very normal for a patient's HR to drop with sleep and this may be his baseline for him. I told him that I did not think this is something we need to be concerned about at this time, but if there is something else you feel like needs to be done then I would call him back. He verbalized understanding.     Marika Acosta RN  [facility]

## 2023-07-31 DIAGNOSIS — N18.31 STAGE 3A CHRONIC KIDNEY DISEASE: ICD-10-CM

## 2023-07-31 DIAGNOSIS — Z12.5 SCREENING FOR PROSTATE CANCER: ICD-10-CM

## 2023-07-31 DIAGNOSIS — I10 ESSENTIAL HYPERTENSION: ICD-10-CM

## 2023-07-31 DIAGNOSIS — E78.2 MIXED HYPERLIPIDEMIA: ICD-10-CM

## 2023-07-31 DIAGNOSIS — Z00.00 HEALTHCARE MAINTENANCE: ICD-10-CM

## 2023-08-02 LAB
ALBUMIN SERPL-MCNC: 4.4 G/DL (ref 3.5–5.2)
ALBUMIN/GLOB SERPL: 2.8 G/DL
ALP SERPL-CCNC: 79 U/L (ref 39–117)
ALT SERPL-CCNC: 18 U/L (ref 1–41)
AST SERPL-CCNC: 15 U/L (ref 1–40)
BILIRUB SERPL-MCNC: 0.5 MG/DL (ref 0–1.2)
BUN SERPL-MCNC: 24 MG/DL (ref 8–23)
BUN/CREAT SERPL: 15.3 (ref 7–25)
CALCIUM SERPL-MCNC: 9.5 MG/DL (ref 8.6–10.5)
CHLORIDE SERPL-SCNC: 107 MMOL/L (ref 98–107)
CHOLEST SERPL-MCNC: 107 MG/DL (ref 0–200)
CO2 SERPL-SCNC: 26.3 MMOL/L (ref 22–29)
CREAT SERPL-MCNC: 1.57 MG/DL (ref 0.76–1.27)
EGFRCR SERPLBLD CKD-EPI 2021: 45.7 ML/MIN/1.73
GLOBULIN SER CALC-MCNC: 1.6 GM/DL
GLUCOSE SERPL-MCNC: 92 MG/DL (ref 65–99)
HDLC SERPL-MCNC: 43 MG/DL (ref 40–60)
LDLC SERPL CALC-MCNC: 41 MG/DL (ref 0–100)
LDLC/HDLC SERPL: 0.89 {RATIO}
POTASSIUM SERPL-SCNC: 4.4 MMOL/L (ref 3.5–5.2)
PROT SERPL-MCNC: 6 G/DL (ref 6–8.5)
PSA SERPL-MCNC: 19 NG/ML (ref 0–4)
SODIUM SERPL-SCNC: 144 MMOL/L (ref 136–145)
TRIGL SERPL-MCNC: 129 MG/DL (ref 0–150)
URATE SERPL-MCNC: 4.2 MG/DL (ref 3.4–7)
VLDLC SERPL CALC-MCNC: 23 MG/DL (ref 5–40)

## 2023-08-07 ENCOUNTER — OFFICE VISIT (OUTPATIENT)
Dept: INTERNAL MEDICINE | Facility: CLINIC | Age: 73
End: 2023-08-07
Payer: MEDICARE

## 2023-08-07 VITALS
SYSTOLIC BLOOD PRESSURE: 130 MMHG | WEIGHT: 218 LBS | OXYGEN SATURATION: 96 % | BODY MASS INDEX: 29.53 KG/M2 | HEART RATE: 89 BPM | TEMPERATURE: 98 F | HEIGHT: 72 IN | DIASTOLIC BLOOD PRESSURE: 80 MMHG

## 2023-08-07 DIAGNOSIS — E78.2 MIXED HYPERLIPIDEMIA: Primary | ICD-10-CM

## 2023-08-07 DIAGNOSIS — I10 ESSENTIAL HYPERTENSION: ICD-10-CM

## 2023-08-07 DIAGNOSIS — Z12.11 SCREENING FOR COLON CANCER: ICD-10-CM

## 2023-08-07 DIAGNOSIS — M1A.0790 CHRONIC IDIOPATHIC GOUT OF FOOT AND ANKLE REGION WITHOUT TOPHUS: ICD-10-CM

## 2023-08-07 PROCEDURE — 1160F RVW MEDS BY RX/DR IN RCRD: CPT | Performed by: FAMILY MEDICINE

## 2023-08-07 PROCEDURE — 99214 OFFICE O/P EST MOD 30 MIN: CPT | Performed by: FAMILY MEDICINE

## 2023-08-07 PROCEDURE — 3075F SYST BP GE 130 - 139MM HG: CPT | Performed by: FAMILY MEDICINE

## 2023-08-07 PROCEDURE — 3079F DIAST BP 80-89 MM HG: CPT | Performed by: FAMILY MEDICINE

## 2023-08-07 PROCEDURE — 1159F MED LIST DOCD IN RCRD: CPT | Performed by: FAMILY MEDICINE

## 2023-08-07 RX ORDER — ENALAPRIL MALEATE 20 MG/1
10 TABLET ORAL 2 TIMES DAILY
Qty: 90 TABLET | Refills: 1 | Status: SHIPPED | OUTPATIENT
Start: 2023-08-07

## 2023-08-07 RX ORDER — ENALAPRIL MALEATE 20 MG/1
20 TABLET ORAL 2 TIMES DAILY
Qty: 90 TABLET | Refills: 1 | Status: SHIPPED | OUTPATIENT
Start: 2023-08-07 | End: 2023-08-07 | Stop reason: SDUPTHER

## 2023-08-07 RX ORDER — ROSUVASTATIN CALCIUM 10 MG/1
10 TABLET, COATED ORAL DAILY
Qty: 90 TABLET | Refills: 1 | Status: SHIPPED | OUTPATIENT
Start: 2023-08-07

## 2023-08-07 RX ORDER — TEMAZEPAM 15 MG/1
15 CAPSULE ORAL
COMMUNITY
Start: 2023-06-05 | End: 2023-10-16

## 2023-08-07 RX ORDER — ACYCLOVIR 400 MG/1
400 TABLET ORAL
COMMUNITY
Start: 2023-06-07

## 2023-08-07 RX ORDER — ALLOPURINOL 300 MG/1
300 TABLET ORAL DAILY
Qty: 90 TABLET | Refills: 3 | Status: SHIPPED | OUTPATIENT
Start: 2023-08-07

## 2023-08-07 NOTE — PROGRESS NOTES
Subjective   Marcus Sandoval is a 73 y.o. male.   Chief Complaint   Patient presents with    Gout    Hyperlipidemia    Hypertension       History of Present Illness     1. HTN- Patient was evaluated at the [facility] in July 2022 for bradycardia and frequent PVCs.  He had extensive work-up done.  He was treated with ablation.  It was successful.  He feels much better after it was done.    He was diagnosed with sleep apnea.  He uses CPAP every night.  It helps.  No lightheadedness, no chest pain, no shortness of breath, no palpitations.  He is on enalapril 10 mg twice a day (he takes half tablet of 20 mg twice a day).    He continues to follow-up with nephrologist on chronic kidney disease.  Creatinine 1.7, GFR 40.  Avoids NSAIDs.     2.  Hyperlipidemia-on Crestor at 10 mg a day.  He takes it every day.  No side effects.  LDL 41 from 48 from 51, HDL 43 LFTs normal.    3. Gout- he followed up with Dr. Arana. He is on allopurinol 300 mg a day and colchicine. Symptoms are controlled.  Dr. Arana advised him to follow-up with us as symptoms are controlled.  Uric acid 4.2.  No flareups in a last year.     Chronic kidney disease- diagnosed after right-sided nephrectomy.  He has a solitary kidney.  He was diagnosed with urothelial cancer in 2016, was treated with right nephrouretrectomy and chemotherapy.  Baseline creatinine is between 1.5 and 2.0.  He follows up with nephrologist.  No blood in urine.  He had 1 episode of kidney stone in the past.  Creatinine 1.57, GFR of 45.7.     BPH - he has a history of PSA elevation.  It is usually between 15-20.  He follows up with urologist twice a year.  He had biopsy twice which was negative.  He had CT scans which were negative.  He is on Proscar at 5 mg a day.  He is scheduled with his urologist next month.  PSA at 19.0.          Review of Systems   Respiratory:  Negative for shortness of breath.    Cardiovascular:  Negative for chest pain and palpitations.   Neurological:   Negative for light-headedness.       Objective   Wt Readings from Last 3 Encounters:   08/07/23 98.9 kg (218 lb)   11/22/22 95.3 kg (210 lb)   11/16/22 97.5 kg (215 lb)      Vitals:    08/07/23 1526   BP: 130/80   Pulse: 89   Temp: 98 øF (36.7 øC)   SpO2: 96%     Temp Readings from Last 3 Encounters:   08/07/23 98 øF (36.7 øC)   11/16/22 98 øF (36.7 øC)   07/01/22 97.3 øF (36.3 øC)     BP Readings from Last 3 Encounters:   08/07/23 130/80   11/22/22 128/80   11/16/22 130/80     Pulse Readings from Last 3 Encounters:   08/07/23 89   11/22/22 87   11/16/22 80     Body mass index is 29.56 kg/mý.    Physical Exam  Constitutional:       Appearance: He is well-developed.   Neck:      Thyroid: No thyromegaly.      Vascular: No carotid bruit.   Cardiovascular:      Rate and Rhythm: Normal rate and regular rhythm.      Heart sounds: Normal heart sounds.   Pulmonary:      Effort: Pulmonary effort is normal.      Breath sounds: Normal breath sounds.   Neurological:      Mental Status: He is alert and oriented to person, place, and time.   Psychiatric:         Behavior: Behavior normal.       Assessment & Plan   Diagnoses and all orders for this visit:    1. Mixed hyperlipidemia (Primary)    2. 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 Screening for colon cancer  -     Ambulatory Referral to General Surgery    4. Essential hypertension    Other orders  -     allopurinol (ZYLOPRIM) 300 MG tablet; Take 1 tablet by mouth Daily.  Dispense: 90 tablet; Refill: 3  -     rosuvastatin (CRESTOR) 10 MG tablet; Take 1 tablet by mouth Daily.  Dispense: 90 tablet; Refill: 1  -     Discontinue: enalapril (VASOTEC) 20 MG tablet; Take 1 tablet by mouth 2 (Two) Times a Day.  Dispense: 90 tablet; Refill: 1  -     enalapril (VASOTEC) 20 MG tablet; Take 0.5 tablets by mouth 2 (Two) Times a Day.  Dispense: 90 tablet; Refill: 1        1.  Hypertension-continue current treatment.  Follow-up in 6 months.  2.   Hyperlipidemia-continue current treatment.  Follow-up in 6 months.  3.  Gout-continue current treatment.  Follow-up in 12 months.              BMI is >= 25 and <30. (Overweight) The following options were offered after discussion;: weight loss educational material (shared in after visit summary)

## 2023-10-05 ENCOUNTER — OFFICE VISIT (OUTPATIENT)
Dept: CARDIOLOGY | Facility: CLINIC | Age: 73
End: 2023-10-05
Payer: MEDICARE

## 2023-10-05 VITALS
OXYGEN SATURATION: 96 % | BODY MASS INDEX: 28.23 KG/M2 | HEIGHT: 72 IN | DIASTOLIC BLOOD PRESSURE: 84 MMHG | HEART RATE: 69 BPM | RESPIRATION RATE: 18 BRPM | WEIGHT: 208.4 LBS | SYSTOLIC BLOOD PRESSURE: 130 MMHG

## 2023-10-05 DIAGNOSIS — I65.21 CAROTID STENOSIS, ASYMPTOMATIC, RIGHT: ICD-10-CM

## 2023-10-05 DIAGNOSIS — I65.22 CAROTID STENOSIS, ASYMPTOMATIC, LEFT: ICD-10-CM

## 2023-10-05 DIAGNOSIS — G47.33 OSA (OBSTRUCTIVE SLEEP APNEA): ICD-10-CM

## 2023-10-05 DIAGNOSIS — I49.3 PVC (PREMATURE VENTRICULAR CONTRACTION): ICD-10-CM

## 2023-10-05 DIAGNOSIS — I10 PRIMARY HYPERTENSION: Primary | ICD-10-CM

## 2023-10-05 DIAGNOSIS — E78.2 MIXED HYPERLIPIDEMIA: ICD-10-CM

## 2023-10-05 PROCEDURE — 99214 OFFICE O/P EST MOD 30 MIN: CPT | Performed by: INTERNAL MEDICINE

## 2023-10-05 PROCEDURE — 3075F SYST BP GE 130 - 139MM HG: CPT | Performed by: INTERNAL MEDICINE

## 2023-10-05 PROCEDURE — 93000 ELECTROCARDIOGRAM COMPLETE: CPT | Performed by: INTERNAL MEDICINE

## 2023-10-05 PROCEDURE — 1160F RVW MEDS BY RX/DR IN RCRD: CPT | Performed by: INTERNAL MEDICINE

## 2023-10-05 PROCEDURE — 1159F MED LIST DOCD IN RCRD: CPT | Performed by: INTERNAL MEDICINE

## 2023-10-05 PROCEDURE — 3079F DIAST BP 80-89 MM HG: CPT | Performed by: INTERNAL MEDICINE

## 2023-10-05 NOTE — PROGRESS NOTES
CARDIOLOGY    Evi Rowell MD    ENCOUNTER DATE:  10/05/2023    Marcus Sandoval / 73 y.o. / male        CHIEF COMPLAINT / REASON FOR OFFICE VISIT     Heart Problem      HISTORY OF PRESENT ILLNESS       HPI    Marcus Sandoval is a 73 y.o. male     This gentleman has hypertension and hyperlipidemia. He was diagnosed with frequent monofocal premature ventricular contractions. He was seen at [facility]. I have reviewed their reports and see that in 07/2022 he wore a 48 hour Holter monitor which showed a PVC burden of 34%. Carotid Doppler 07/2022 showed some nonobstructive bilateral carotid artery disease. Stress echo 07/2022 showed normal LV function. No significant valve disease. No evidence of ischemia. On 08/09/2022, he had an ablation for PVC's performed at the [facility]. He had carotid Doppler at the [facility] and he has some mild bilateral carotid artery stenosis and may be some disease in the vertebral arteries but they were not well visualized.     He comes in today.  He did not get the MRI done.  He has been diagnosed with significant sleep apnea and is now on positive airway pressure at night and is doing much better.  He also changed his pillow and he is no longer having neck pain.  Unfortunately he has had a lot of stressful events in his life including his son-in-law who has been diagnosed with glioblastoma.    REVIEW OF SYSTEMS     Review of Systems   Constitutional: Negative for chills, fever, weight gain and weight loss.   Cardiovascular:  Negative for leg swelling.   Respiratory:  Negative for cough, snoring and wheezing.    Hematologic/Lymphatic: Negative for bleeding problem. Does not bruise/bleed easily.   Skin:  Negative for color change.   Musculoskeletal:  Negative for falls, joint pain and myalgias.   Gastrointestinal:  Negative for melena.   Genitourinary:  Negative for hematuria.   Neurological:  Negative for excessive daytime sleepiness.   Psychiatric/Behavioral:   "Negative for depression. The patient is not nervous/anxious.        VITAL SIGNS     Visit Vitals  /84 (BP Location: Right arm, Patient Position: Sitting, Cuff Size: Adult)   Pulse 69   Resp 18   Ht 182.9 cm (72\")   Wt 94.5 kg (208 lb 6.4 oz)   SpO2 96%   BMI 28.26 kg/m²         Wt Readings from Last 3 Encounters:   10/05/23 94.5 kg (208 lb 6.4 oz)   08/07/23 98.9 kg (218 lb)   11/22/22 95.3 kg (210 lb)     Body mass index is 28.26 kg/m².      PHYSICAL EXAMINATION     Constitutional:       General: Not in acute distress.  Neck:      Vascular: No carotid bruit or JVD.   Pulmonary:      Effort: Pulmonary effort is normal.      Breath sounds: Normal breath sounds.   Cardiovascular:      Normal rate. Regular rhythm.      Murmurs: There is no murmur.   Psychiatric:         Mood and Affect: Mood and affect normal.         REVIEWED DATA       ECG 12 Lead    Date/Time: 10/5/2023 1:24 PM  Performed by: Evi Rowell MD  Authorized by: Evi Rowell MD   Comparison: compared with previous ECG from 11/22/2022  Similar to previous ECG  Rhythm: sinus rhythm  BPM: 68  Conduction: conduction normal  ST Segments: ST segments normal  T Waves: T waves normal    Clinical impression: normal ECG          Lipid Panel          11/11/2022    08:48 8/1/2023    09:13   Lipid Panel   Total Cholesterol 119  107    Triglycerides 128  129    HDL Cholesterol 48  43    VLDL Cholesterol 23  23    LDL Cholesterol  48  41    LDL/HDL Ratio 0.95  0.89        Lab Results   Component Value Date    GLUCOSE 92 08/01/2023    BUN 24 (H) 08/01/2023    CREATININE 1.57 (H) 08/01/2023    EGFRRESULT 45.7 (L) 08/01/2023    BCR 15.3 08/01/2023    K 4.4 08/01/2023    CO2 26.3 08/01/2023    CALCIUM 9.5 08/01/2023    PROTENTOTREF 6.0 08/01/2023    ALBUMIN 4.4 08/01/2023    BILITOT 0.5 08/01/2023    AST 15 08/01/2023    ALT 18 08/01/2023       ASSESSMENT & PLAN      Diagnosis Plan   1. Primary hypertension        2. Mixed hyperlipidemia        3. PVC " (premature ventricular contraction)        4. Carotid stenosis, asymptomatic, left        5. Carotid stenosis, asymptomatic, right        6. SHALINI (obstructive sleep apnea)            1.  Frequent premature ventricular contractions, status post ablation at [facility] in 08/2022.  No PVCs on today's EKG or exam.  Normal LV function by echo at the [facility] July 2022.  2.  Hypertension. His blood pressure looks normal today.  He says it is running higher than it usually does but attributes this to all the stress he is under.  3.  Hyperlipidemia. On rosuvastatin.  I reviewed lipid panel from August 2023 and lipids are well controlled.  4.  Carotid artery stenosis.  This was noted on Doppler at the [facility] and was mild.  We discussed an MRA of the neck last year but he opted not to do it.     Follow-up in 1 year.      Orders Placed This Encounter   Procedures    ECG 12 Lead     This order was created via procedure documentation     Order Specific Question:   Release to patient     Answer:   Routine Release [[id number]]           MEDICATIONS         Discharge Medications            Accurate as of October 5, 2023  1:25 PM. If you have any questions, ask your nurse or doctor.                Continue These Medications        Instructions Start Date   acyclovir 400 MG tablet  Commonly known as: ZOVIRAX   400 mg      allopurinol 300 MG tablet  Commonly known as: ZYLOPRIM   300 mg, Oral, Daily      Coenzyme Q10 10 MG capsule   Oral, Daily      enalapril 20 MG tablet  Commonly known as: VASOTEC   10 mg, Oral, 2 Times Daily      finasteride 5 MG tablet  Commonly known as: PROSCAR   No dose, route, or frequency recorded.      rosuvastatin 10 MG tablet  Commonly known as: CRESTOR   10 mg, Oral, Daily      temazepam 15 MG capsule  Commonly known as: RESTORIL   15 mg, Oral                 Evi Rowell MD  10/05/23  13:25 EDT    Part of this note may be an electronic transcription/translation of spoken  language to printed text using the Dragon dictation system.

## 2024-01-08 RX ORDER — ENALAPRIL MALEATE 20 MG/1
20 TABLET ORAL 2 TIMES DAILY
Qty: 90 TABLET | Refills: 1 | OUTPATIENT
Start: 2024-01-08

## 2024-01-09 RX ORDER — ENALAPRIL MALEATE 20 MG/1
20 TABLET ORAL 2 TIMES DAILY
Qty: 90 TABLET | Refills: 1 | Status: SHIPPED | OUTPATIENT
Start: 2024-01-09

## 2024-02-06 ENCOUNTER — TELEMEDICINE (OUTPATIENT)
Dept: INTERNAL MEDICINE | Facility: CLINIC | Age: 74
End: 2024-02-06
Payer: MEDICARE

## 2024-02-06 DIAGNOSIS — U07.1 COVID-19 VIRUS INFECTION: Primary | ICD-10-CM

## 2024-02-06 PROCEDURE — 1160F RVW MEDS BY RX/DR IN RCRD: CPT | Performed by: FAMILY MEDICINE

## 2024-02-06 PROCEDURE — 1159F MED LIST DOCD IN RCRD: CPT | Performed by: FAMILY MEDICINE

## 2024-02-06 PROCEDURE — 99213 OFFICE O/P EST LOW 20 MIN: CPT | Performed by: FAMILY MEDICINE

## 2024-02-06 NOTE — PROGRESS NOTES
Subjective   Marcus Sandoval is a 73 y.o. male.   Chief Complaint   Patient presents with    Fever    Nasal Congestion     For 1 day - tested positive Covid yesterday      Both patient and I are in [place]    Video visit.    COVID infection-symptoms started with sore throat yesterday.  Fever of 101.5.  Chills.  He has dry cough, no shortness of breath or wheezing.  No night cough.  He has nasal congestion.  He tested yesterday for COVID twice and it was positive.  He used Advil sinus and it helped with nasal congestion.  This morning temperature was 100.0.      Review of Systems   Constitutional:  Positive for fever.   HENT:  Positive for congestion and sore throat.    Respiratory:  Positive for cough. Negative for shortness of breath and wheezing.          Objective   Wt Readings from Last 3 Encounters:   10/05/23 94.5 kg (208 lb 6.4 oz)   08/07/23 98.9 kg (218 lb)   11/22/22 95.3 kg (210 lb)    There were no vitals filed for this visit.  Temp Readings from Last 3 Encounters:   08/07/23 98 °F (36.7 °C)   11/16/22 98 °F (36.7 °C)   07/01/22 97.3 °F (36.3 °C)     BP Readings from Last 3 Encounters:   10/05/23 130/84   08/07/23 130/80   11/22/22 128/80     Pulse Readings from Last 3 Encounters:   10/05/23 69   08/07/23 89   11/22/22 87     There is no height or weight on file to calculate BMI.    Physical Exam  Pulmonary:      Effort: Pulmonary effort is normal.   Neurological:      Mental Status: He is alert.         Assessment & Plan   Diagnoses and all orders for this visit:    1. COVID-19 virus infection (Primary)        COVID infection-patient will use Mucinex 1200 mg twice a day.  He can use Afrin for nasal congestion for no longer than 3 days.  Stay well-hydrated.  Avoid NSAIDs including Advil.  Tylenol is a safer option for kidneys.  If additional help needed for congestion Sudafed can be used short-term, but blood pressure should be monitored.  We discussed treatment with antivirals.  Patient is in a  timeframe to use it.  They can decrease mortality/hospital admissions.  Side effects including GI symptoms reviewed.  Warnings on the rebound symptoms.  Patient prefers to treat it symptomatically.

## 2024-02-09 DIAGNOSIS — M1A.0790 CHRONIC IDIOPATHIC GOUT OF FOOT AND ANKLE REGION WITHOUT TOPHUS: ICD-10-CM

## 2024-02-09 DIAGNOSIS — E78.2 MIXED HYPERLIPIDEMIA: Primary | ICD-10-CM

## 2024-02-14 LAB
ALBUMIN SERPL-MCNC: 4.7 G/DL (ref 3.5–5.2)
ALBUMIN/GLOB SERPL: 2.8 G/DL
ALP SERPL-CCNC: 81 U/L (ref 39–117)
ALT SERPL-CCNC: 19 U/L (ref 1–41)
AST SERPL-CCNC: 18 U/L (ref 1–40)
BILIRUB SERPL-MCNC: 0.7 MG/DL (ref 0–1.2)
BUN SERPL-MCNC: 24 MG/DL (ref 8–23)
BUN/CREAT SERPL: 13.3 (ref 7–25)
CALCIUM SERPL-MCNC: 9.7 MG/DL (ref 8.6–10.5)
CHLORIDE SERPL-SCNC: 100 MMOL/L (ref 98–107)
CHOLEST SERPL-MCNC: 112 MG/DL (ref 0–200)
CO2 SERPL-SCNC: 24.8 MMOL/L (ref 22–29)
CREAT SERPL-MCNC: 1.8 MG/DL (ref 0.76–1.27)
EGFRCR SERPLBLD CKD-EPI 2021: 38.8 ML/MIN/1.73
GLOBULIN SER CALC-MCNC: 1.7 GM/DL
GLUCOSE SERPL-MCNC: 86 MG/DL (ref 65–99)
HDLC SERPL-MCNC: 41 MG/DL (ref 40–60)
LDLC SERPL CALC-MCNC: 45 MG/DL (ref 0–100)
LDLC/HDLC SERPL: 0.98 {RATIO}
POTASSIUM SERPL-SCNC: 4.4 MMOL/L (ref 3.5–5.2)
PROT SERPL-MCNC: 6.4 G/DL (ref 6–8.5)
SODIUM SERPL-SCNC: 136 MMOL/L (ref 136–145)
TRIGL SERPL-MCNC: 155 MG/DL (ref 0–150)
URATE SERPL-MCNC: 3.9 MG/DL (ref 3.4–7)
VLDLC SERPL CALC-MCNC: 26 MG/DL (ref 5–40)

## 2024-02-20 ENCOUNTER — OFFICE VISIT (OUTPATIENT)
Dept: INTERNAL MEDICINE | Facility: CLINIC | Age: 74
End: 2024-02-20
Payer: MEDICARE

## 2024-02-20 VITALS
WEIGHT: 218 LBS | OXYGEN SATURATION: 96 % | DIASTOLIC BLOOD PRESSURE: 70 MMHG | SYSTOLIC BLOOD PRESSURE: 120 MMHG | HEART RATE: 73 BPM | BODY MASS INDEX: 29.53 KG/M2 | TEMPERATURE: 97.1 F | HEIGHT: 72 IN

## 2024-02-20 DIAGNOSIS — Z12.11 SCREENING FOR COLON CANCER: ICD-10-CM

## 2024-02-20 DIAGNOSIS — E78.2 MIXED HYPERLIPIDEMIA: ICD-10-CM

## 2024-02-20 DIAGNOSIS — M1A.0790 CHRONIC IDIOPATHIC GOUT OF FOOT AND ANKLE REGION WITHOUT TOPHUS: ICD-10-CM

## 2024-02-20 DIAGNOSIS — Z00.00 MEDICARE ANNUAL WELLNESS VISIT, SUBSEQUENT: Primary | ICD-10-CM

## 2024-02-20 DIAGNOSIS — I10 ESSENTIAL HYPERTENSION: ICD-10-CM

## 2024-02-20 RX ORDER — ROSUVASTATIN CALCIUM 10 MG/1
10 TABLET, COATED ORAL DAILY
Qty: 90 TABLET | Refills: 1 | Status: SHIPPED | OUTPATIENT
Start: 2024-02-20

## 2024-02-20 NOTE — PROGRESS NOTES
The ABCs of the Annual Wellness Visit  Subsequent Medicare Wellness Visit    Subjective      Marcus Sandoval is a 73 y.o. male who presents for a Subsequent Medicare Wellness Visit.    The following portions of the patient's history were reviewed and   updated as appropriate: allergies, current medications, past family history, past medical history, past social history, past surgical history, and problem list.    Compared to one year ago, the patient feels his physical   health is better.    Compared to one year ago, the patient feels his mental   health is better.    Recent Hospitalizations:  He was not admitted to the hospital during the last year.       Current Medical Providers:  Patient Care Team:  Birgit Adam MD as PCP - General (Family Medicine)    Outpatient Medications Prior to Visit   Medication Sig Dispense Refill    allopurinol (ZYLOPRIM) 300 MG tablet Take 1 tablet by mouth Daily. 90 tablet 3    Coenzyme Q10 10 MG capsule Take  by mouth Daily.      enalapril (VASOTEC) 20 MG tablet TAKE 1 TABLET BY MOUTH TWICE A DAY 90 tablet 1    finasteride (PROSCAR) 5 MG tablet       rosuvastatin (CRESTOR) 10 MG tablet Take 1 tablet by mouth Daily. 90 tablet 1     No facility-administered medications prior to visit.       No opioid medication identified on active medication list. I have reviewed chart for other potential  high risk medication/s and harmful drug interactions in the elderly.        Aspirin is not on active medication list.  Aspirin use is not indicated based on review of current medical condition/s. Risk of harm outweighs potential benefits.  .  No coronary artery disease, no history of stroke, no known peripheral vascular disease.    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  ACP discussion was held with the patient during this visit. Patient has an advance directive (not in EMR), copy requested.     Objective    Vitals:    24 1432   BP: 120/70   Pulse: 73   Temp: 97.1 °F (36.2 °C)   SpO2: 96%   Weight: 98.9 kg (218 lb)   Height: 182.9 cm (72.01\")   PainSc: 0-No pain     Estimated body mass index is 29.56 kg/m² as calculated from the following:    Height as of this encounter: 182.9 cm (72.01\").    Weight as of this encounter: 98.9 kg (218 lb).           Does the patient have evidence of cognitive impairment?   No    Lab Results   Component Value Date    CHLPL 112 2024    TRIG 155 (H) 2024    HDL 41 2024    LDL 45 2024    VLDL 26 2024          HEALTH RISK ASSESSMENT    Smoking Status:  Social History     Tobacco Use   Smoking Status Never    Passive exposure: Never   Smokeless Tobacco Never     Alcohol Consumption:  Social History     Substance and Sexual Activity   Alcohol Use Yes    Alcohol/week: 3.0 - 4.0 standard drinks of alcohol    Types: 3 - 4 Standard drinks or equivalent per week    Comment: CAFFEINE 1CPD     Fall Risk Screen:    DAVONTE Fall Risk Assessment was completed, and patient is at LOW risk for falls.Assessment completed on:2024    Depression Screenin/20/2024     2:00 PM   PHQ-2/PHQ-9 Depression Screening   Little Interest or Pleasure in Doing Things 0-->not at all   Feeling Down, Depressed or Hopeless 0-->not at all   PHQ-9: Brief Depression Severity Measure Score 0       Health Habits and Functional and Cognitive Screenin/20/2024     2:00 PM   Functional & Cognitive Status   Do you have difficulty preparing food and eating? No   Do you have difficulty bathing " yourself, getting dressed or grooming yourself? No   Do you have difficulty using the toilet? No   Do you have difficulty moving around from place to place? No   Do you have trouble with steps or getting out of a bed or a chair? No   Current Diet Well Balanced Diet   Dental Exam Up to date   Eye Exam Up to date   Exercise (times per week) 3 times per week   Current Exercises Include Treadmill   Do you need help using the phone?  No   Are you deaf or do you have serious difficulty hearing?  No   Do you need help to go to places out of walking distance? No   Do you need help shopping? No   Do you need help preparing meals?  No   Do you need help with housework?  No   Do you need help with laundry? No   Do you need help taking your medications? No   Do you need help managing money? No   Do you ever drive or ride in a car without wearing a seat belt? No   Have you felt unusual stress, anger or loneliness in the last month? No   Who do you live with? Spouse   If you need help, do you have trouble finding someone available to you? No   Have you been bothered in the last four weeks by sexual problems? No   Do you have difficulty concentrating, remembering or making decisions? No       Age-appropriate Screening Schedule:  Refer to the list below for future screening recommendations based on patient's age, sex and/or medical conditions. Orders for these recommended tests are listed in the plan section. The patient has been provided with a written plan.    Health Maintenance   Topic Date Due    COLORECTAL CANCER SCREENING  Never done    TDAP/TD VACCINES (2 - Tdap) 12/31/2021    ANNUAL WELLNESS VISIT  Never done    BMI FOLLOWUP  08/07/2024    LIPID PANEL  02/13/2025    HEPATITIS C SCREENING  Completed    COVID-19 Vaccine  Completed    RSV Vaccine - Adults  Completed    INFLUENZA VACCINE  Completed    Pneumococcal Vaccine 65+  Completed    ZOSTER VACCINE  Completed                  CMS Preventative Services Quick  Reference  Risk Factors Identified During Encounter:    Immunizations Discussed/Encouraged: Tdap    The above risks/problems have been discussed with the patient.  Pertinent information has been shared with the patient in the After Visit Summary.    Information on healthy heart diet added to discharge summary.  Information on exercise to stay healthy added to discharge summary.  He is due for colon cancer screening.  We discussed colonoscopy versus Cologuard.  Shared decision to order Cologuard.  We discussed recommendations for vaccinations including tetanus vaccine.  He will get it at the pharmacy.    Continue regular dental appointments at least every 6 months.  He has living will at home and will bring it to next appointment.    Diagnoses and all orders for this visit:    1. Medicare annual wellness visit, subsequent (Primary)    2. Screening for colon cancer  -     Cologuard - Stool, Per Rectum; Future    3. Mixed hyperlipidemia    4. 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 Essential hypertension    Other orders  -     rosuvastatin (CRESTOR) 10 MG tablet; Take 1 tablet by mouth Daily.  Dispense: 90 tablet; Refill: 1        Follow Up:   Next Medicare Wellness visit to be scheduled in 1 year.      An After Visit Summary and PPPS were made available to the patient.

## 2024-02-20 NOTE — PATIENT INSTRUCTIONS
Medicare Wellness  Personal Prevention Plan of Service     Date of Office Visit:    Encounter Provider:  Birgit Adam MD  Place of Service:  [facility]  Patient Name: Marcus Sandoval  :  1950    As part of the Medicare Wellness portion of your visit today, we are providing you with this personalized preventive plan of services (PPPS). This plan is based upon recommendations of the [place] Preventive Services Task Force (USPSTF) and the Advisory Committee on Immunization Practices (ACIP).    This lists the preventive care services that should be considered, and provides dates of when you are due. Items listed as completed are up-to-date and do not require any further intervention.    Health Maintenance   Topic Date Due    COLORECTAL CANCER SCREENING  Never done    TDAP/TD VACCINES (2 - Tdap) 2021    ANNUAL WELLNESS VISIT  Never done    BMI FOLLOWUP  2024    LIPID PANEL  2025    HEPATITIS C SCREENING  Completed    COVID-19 Vaccine  Completed    RSV Vaccine - Adults  Completed    INFLUENZA VACCINE  Completed    Pneumococcal Vaccine 65+  Completed    ZOSTER VACCINE  Completed       Orders Placed This Encounter   Procedures    Cologuard - Stool, Per Rectum     Standing Status:   Future     Standing Expiration Date:   2025     Order Specific Question:   Release to patient     Answer:   Routine Release [[id number]]       Return in about 6 months (around 2024).

## 2024-02-20 NOTE — PROGRESS NOTES
Subjective   Marcus Sandoval is a 73 y.o. male.   Chief Complaint   Patient presents with    Medicare Wellness-subsequent    Hypertension    Hyperlipidemia    Gout       History of Present Illness      1.HTN- Patient was evaluated at the [facility] in July 2022 for bradycardia and frequent PVCs.  He had extensive work-up done.  He was treated with ablation.  It was successful.  He felt much better after it was done.    He was diagnosed with sleep apnea.  He uses CPAP every night.  It helps.  No lightheadedness, no chest pain, no shortness of breath, no palpitations.    He is on enalapril 10 mg twice a day (he takes half tablet of 20 mg twice a day).    He continues to follow-up with nephrologist on chronic kidney disease. Creatinine 1.8 , GFR 38.8.  He avoids NSAIDs.  No blood seen in urine.     2.  Hyperlipidemia-on Crestor at 10 mg a day.  He takes it every day.  No side effects.  LDL 45 from 41 from 48 from 51, HDL 41 from 43. LFTs normal.     3. Gout- he followed up with Dr. Arana. He is on allopurinol 300 mg a day and colchicine. Symptoms are controlled.  Dr. Arana advised him to follow-up with us as symptoms are controlled.  Uric acid 3.9.  No flareups in a last year.     Chronic kidney disease- diagnosed after right-sided nephrectomy.  He has a solitary kidney.  He was diagnosed with urothelial cancer in 2016, was treated with right nephrouretrectomy and chemotherapy.  Baseline creatinine is between 1.5 and 2.0.  He follows up with nephrologist every 6 months.    He had 1 episode of kidney stone in the past.       BPH - he has a history of PSA elevation.  It is usually between 15-20.  He follows up with urologist twice a year.  He had biopsy twice which was negative.  He had CT scans which were negative.  He is on Proscar at 5 mg a day.     Patient had COVID infection earlier this month.  He recovered completely.    Review of Systems   Respiratory: Negative.     Cardiovascular: Negative.    Neurological:  Negative.    Psychiatric/Behavioral: Negative.           Objective   Wt Readings from Last 3 Encounters:   02/20/24 98.9 kg (218 lb)   10/05/23 94.5 kg (208 lb 6.4 oz)   08/07/23 98.9 kg (218 lb)      Vitals:    02/20/24 1432   BP: 120/70   Pulse: 73   Temp: 97.1 °F (36.2 °C)   SpO2: 96%     Temp Readings from Last 3 Encounters:   02/20/24 97.1 °F (36.2 °C)   08/07/23 98 °F (36.7 °C)   11/16/22 98 °F (36.7 °C)     BP Readings from Last 3 Encounters:   02/20/24 120/70   10/05/23 130/84   08/07/23 130/80     Pulse Readings from Last 3 Encounters:   02/20/24 73   10/05/23 69   08/07/23 89     Body mass index is 29.56 kg/m².    Physical Exam  Constitutional:       Appearance: He is well-developed.   Neck:      Thyroid: No thyromegaly.      Vascular: No carotid bruit.   Cardiovascular:      Rate and Rhythm: Normal rate and regular rhythm.      Heart sounds: Normal heart sounds.   Pulmonary:      Effort: Pulmonary effort is normal.      Breath sounds: Normal breath sounds.   Skin:     General: Skin is warm and dry.   Neurological:      Mental Status: He is alert and oriented to person, place, and time.   Psychiatric:         Behavior: Behavior normal.         Assessment & Plan   Diagnoses and all orders for this visit:    1. Medicare annual wellness visit, subsequent (Primary)    2. Screening for colon cancer  -     Cologuard - Stool, Per Rectum; Future    3. Mixed hyperlipidemia    4. 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 Essential hypertension    Other orders  -     rosuvastatin (CRESTOR) 10 MG tablet; Take 1 tablet by mouth Daily.  Dispense: 90 tablet; Refill: 1        Hypertension-continue current treatment.  Follow-up in 6 months.    Hyperlipidemia-continue current treatment.  Follow-up in 6 months.    Gout-continue current treatment.  Follow-up in 6- 12 months.

## 2024-04-24 RX ORDER — ENALAPRIL MALEATE 20 MG/1
20 TABLET ORAL 2 TIMES DAILY
Qty: 90 TABLET | Refills: 1 | Status: SHIPPED | OUTPATIENT
Start: 2024-04-24

## 2024-06-08 NOTE — TELEPHONE ENCOUNTER
Controlled  Monitor GI symptoms   Received referral. Call lm for pt to contact office (colonoscopy screening)

## 2024-07-22 RX ORDER — ENALAPRIL MALEATE 20 MG/1
20 TABLET ORAL 2 TIMES DAILY
Qty: 90 TABLET | Refills: 1 | Status: SHIPPED | OUTPATIENT
Start: 2024-07-22

## 2024-08-12 DIAGNOSIS — I10 ESSENTIAL HYPERTENSION: ICD-10-CM

## 2024-08-12 DIAGNOSIS — M1A.0790 CHRONIC IDIOPATHIC GOUT OF FOOT AND ANKLE REGION WITHOUT TOPHUS: ICD-10-CM

## 2024-08-12 DIAGNOSIS — E78.2 MIXED HYPERLIPIDEMIA: Primary | ICD-10-CM

## 2024-08-12 LAB
ALBUMIN SERPL-MCNC: 4.3 G/DL (ref 3.5–5.2)
ALBUMIN/GLOB SERPL: 2.4 G/DL
ALP SERPL-CCNC: 78 U/L (ref 39–117)
ALT SERPL-CCNC: 18 U/L (ref 1–41)
AST SERPL-CCNC: 16 U/L (ref 1–40)
BILIRUB SERPL-MCNC: 0.7 MG/DL (ref 0–1.2)
BUN SERPL-MCNC: 23 MG/DL (ref 8–23)
BUN/CREAT SERPL: 14.9 (ref 7–25)
CALCIUM SERPL-MCNC: 8.9 MG/DL (ref 8.6–10.5)
CHLORIDE SERPL-SCNC: 103 MMOL/L (ref 98–107)
CHOLEST SERPL-MCNC: 108 MG/DL (ref 0–200)
CO2 SERPL-SCNC: 26.7 MMOL/L (ref 22–29)
CREAT SERPL-MCNC: 1.54 MG/DL (ref 0.76–1.27)
EGFRCR SERPLBLD CKD-EPI 2021: 46.5 ML/MIN/1.73
GLOBULIN SER CALC-MCNC: 1.8 GM/DL
GLUCOSE SERPL-MCNC: 89 MG/DL (ref 65–99)
HDLC SERPL-MCNC: 42 MG/DL (ref 40–60)
LDLC SERPL CALC-MCNC: 40 MG/DL (ref 0–100)
LDLC/HDLC SERPL: 0.84 {RATIO}
POTASSIUM SERPL-SCNC: 4 MMOL/L (ref 3.5–5.2)
PROT SERPL-MCNC: 6.1 G/DL (ref 6–8.5)
SODIUM SERPL-SCNC: 141 MMOL/L (ref 136–145)
TRIGL SERPL-MCNC: 153 MG/DL (ref 0–150)
URATE SERPL-MCNC: 3.8 MG/DL (ref 3.4–7)
VLDLC SERPL CALC-MCNC: 26 MG/DL (ref 5–40)

## 2024-08-20 ENCOUNTER — OFFICE VISIT (OUTPATIENT)
Dept: INTERNAL MEDICINE | Facility: CLINIC | Age: 74
End: 2024-08-20
Payer: MEDICARE

## 2024-08-20 VITALS
HEIGHT: 72 IN | OXYGEN SATURATION: 95 % | BODY MASS INDEX: 29.12 KG/M2 | TEMPERATURE: 98.4 F | HEART RATE: 78 BPM | SYSTOLIC BLOOD PRESSURE: 130 MMHG | WEIGHT: 215 LBS | DIASTOLIC BLOOD PRESSURE: 70 MMHG

## 2024-08-20 DIAGNOSIS — E78.2 MIXED HYPERLIPIDEMIA: ICD-10-CM

## 2024-08-20 DIAGNOSIS — I10 ESSENTIAL HYPERTENSION: Primary | ICD-10-CM

## 2024-08-20 DIAGNOSIS — M1A.0790 CHRONIC IDIOPATHIC GOUT OF FOOT AND ANKLE REGION WITHOUT TOPHUS: ICD-10-CM

## 2024-08-20 PROCEDURE — 99214 OFFICE O/P EST MOD 30 MIN: CPT | Performed by: FAMILY MEDICINE

## 2024-08-20 PROCEDURE — 1160F RVW MEDS BY RX/DR IN RCRD: CPT | Performed by: FAMILY MEDICINE

## 2024-08-20 PROCEDURE — 3078F DIAST BP <80 MM HG: CPT | Performed by: FAMILY MEDICINE

## 2024-08-20 PROCEDURE — 3075F SYST BP GE 130 - 139MM HG: CPT | Performed by: FAMILY MEDICINE

## 2024-08-20 PROCEDURE — 1159F MED LIST DOCD IN RCRD: CPT | Performed by: FAMILY MEDICINE

## 2024-08-20 PROCEDURE — 1126F AMNT PAIN NOTED NONE PRSNT: CPT | Performed by: FAMILY MEDICINE

## 2024-08-20 PROCEDURE — G2211 COMPLEX E/M VISIT ADD ON: HCPCS | Performed by: FAMILY MEDICINE

## 2024-08-20 RX ORDER — ALLOPURINOL 300 MG/1
300 TABLET ORAL DAILY
Qty: 90 TABLET | Refills: 3 | Status: SHIPPED | OUTPATIENT
Start: 2024-08-20

## 2024-08-20 RX ORDER — ROSUVASTATIN CALCIUM 10 MG/1
10 TABLET, COATED ORAL DAILY
Qty: 90 TABLET | Refills: 1 | Status: SHIPPED | OUTPATIENT
Start: 2024-08-20

## 2024-08-20 RX ORDER — MULTIPLE VITAMINS W/ MINERALS TAB 9MG-400MCG
1 TAB ORAL EVERY OTHER DAY
COMMUNITY

## 2024-08-20 NOTE — PROGRESS NOTES
Subjective   Marcus Sandoval is a 74 y.o. male.   Chief Complaint   Patient presents with    Hyperlipidemia    Hypertension    Gout       History of Present Illness     1.HTN- Patient was evaluated at the [facility] in July 2022 for bradycardia and frequent PVCs.  He had extensive work-up done.  He was treated with ablation.  It was successful.  He felt much better after it was done.    He was diagnosed with sleep apnea.  He uses CPAP every night.  It helps.    No lightheadedness, no chest pain, no shortness of breath, no palpitations.    He is on enalapril 10 mg twice a day (he takes half tablet of 20 mg twice a day).  He takes it every day.  He has no side effects from.  He continues to follow-up with nephrologist on chronic kidney disease. Creatinine 1.54 from 1.8 , GFR 46.5 from 38.8.  He avoids NSAIDs.  No blood seen in urine.     2.  Hyperlipidemia-on Crestor at 10 mg a day.  He takes it every day.  No side effects.  LDL 40 from 45 from 41 from 48 from 51, HDL 42 from 41 from 43. LFTs normal.     3. Gout- he followed up with Dr. Arana. He is on allopurinol 300 mg a day and colchicine. Symptoms are controlled.  Dr. Arana advised him to follow-up with us as symptoms are controlled.  No change from last office visit. Uric acid 3.8 from 3.9.      Review of Systems   Respiratory:  Negative for shortness of breath.    Cardiovascular:  Negative for chest pain and palpitations.   Musculoskeletal:  Negative for myalgias.   Neurological:  Negative for light-headedness.         Objective   Wt Readings from Last 3 Encounters:   08/20/24 97.5 kg (215 lb)   02/20/24 98.9 kg (218 lb)   10/05/23 94.5 kg (208 lb 6.4 oz)      Vitals:    08/20/24 1032   BP: 130/70   Pulse: 78   Temp: 98.4 °F (36.9 °C)   SpO2: 95%     Temp Readings from Last 3 Encounters:   08/20/24 98.4 °F (36.9 °C)   02/20/24 97.1 °F (36.2 °C)   08/07/23 98 °F (36.7 °C)     BP Readings from Last 3 Encounters:   08/20/24 130/70   02/20/24 120/70   10/05/23  130/84     Pulse Readings from Last 3 Encounters:   08/20/24 78   02/20/24 73   10/05/23 69     Body mass index is 29.15 kg/m².    Physical Exam  Constitutional:       Appearance: He is well-developed.   Neck:      Thyroid: No thyromegaly.      Vascular: No carotid bruit.   Cardiovascular:      Rate and Rhythm: Normal rate and regular rhythm.      Heart sounds: Normal heart sounds.   Pulmonary:      Effort: Pulmonary effort is normal.      Breath sounds: Normal breath sounds.   Skin:     General: Skin is warm and dry.   Neurological:      Mental Status: He is alert.   Psychiatric:         Behavior: Behavior normal.         Assessment & Plan   Diagnoses and all orders for this visit:    1. Essential hypertension (Primary)  -     Comprehensive Metabolic Panel; Future    2. Mixed hyperlipidemia  -     Comprehensive Metabolic Panel; Future  -     Lipid Panel With LDL / HDL Ratio; Future    3. 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  Dispense: 90 tablet; Refill: 3  -     rosuvastatin (CRESTOR) 10 MG tablet; Take 1 tablet by mouth Daily.  Dispense: 90 tablet; Refill: 1        HTN-continue current treatment.  Follow-up in 6 months.    Hyperlipidemia-continue current treatment.  Follow-up in 6 months.    Gout-continue current treatment.  Follow-up in 6 months.          BMI is >= 25 and <30. (Overweight) The following options were offered after discussion;: weight loss educational material (shared in after visit summary)

## 2024-09-26 ENCOUNTER — TELEPHONE (OUTPATIENT)
Dept: CARDIOLOGY | Facility: CLINIC | Age: 74
End: 2024-09-26

## 2024-09-26 NOTE — TELEPHONE ENCOUNTER
Caller: NARENDRA  Relationship: SELF    Best call back number: [phone]        What was the call regarding: RESCHEDULE APT THAT WAS ON 9/26/24- PT WAS SICK AND NOT AVAILABILITY FOR TWO MONTHS AT [employer] OR [employer] [facility]

## 2024-10-21 RX ORDER — ENALAPRIL MALEATE 20 MG/1
20 TABLET ORAL 2 TIMES DAILY
Qty: 180 TABLET | Refills: 0 | Status: SHIPPED | OUTPATIENT
Start: 2024-10-21

## 2024-11-26 ENCOUNTER — OFFICE VISIT (OUTPATIENT)
Dept: CARDIOLOGY | Facility: CLINIC | Age: 74
End: 2024-11-26
Payer: MEDICARE

## 2024-11-26 VITALS
SYSTOLIC BLOOD PRESSURE: 120 MMHG | OXYGEN SATURATION: 98 % | WEIGHT: 218 LBS | HEART RATE: 68 BPM | DIASTOLIC BLOOD PRESSURE: 88 MMHG | HEIGHT: 72 IN | BODY MASS INDEX: 29.53 KG/M2

## 2024-11-26 DIAGNOSIS — I10 PRIMARY HYPERTENSION: ICD-10-CM

## 2024-11-26 DIAGNOSIS — I49.3 PVC (PREMATURE VENTRICULAR CONTRACTION): Primary | ICD-10-CM

## 2024-11-26 DIAGNOSIS — I65.22 CAROTID STENOSIS, ASYMPTOMATIC, LEFT: ICD-10-CM

## 2024-11-26 DIAGNOSIS — I65.21 CAROTID STENOSIS, ASYMPTOMATIC, RIGHT: ICD-10-CM

## 2024-11-26 DIAGNOSIS — E78.2 MIXED HYPERLIPIDEMIA: ICD-10-CM

## 2024-11-26 NOTE — ASSESSMENT & PLAN NOTE
S/p ablation at the [facility] in 2022  No recurrence of palpitations  EG reveals normal sinus rhythm with no PVCs

## 2024-11-26 NOTE — PROGRESS NOTES
"    CARDIOLOGY        Patient Name: Marcus Sandoval  :1950  Age: 74 y.o.  Primary Cardiologist: Evi Rowell MD  Encounter Provider:  MARTÍN Drake    Date of Service: 2024      CHIEF COMPLAINT / REASON FOR OFFICE VISIT     Follow-up, Hyperlipidemia, and Hypertension      HISTORY OF PRESENT ILLNESS       HPI  Marcus Sandoval is a 74 y.o. male who presents today for annual assessment.     Pt has a  history significant for hyperlipidemia, hypertension, PVCs, SHALINI, carotid artery stenosis.    Patient has history of monofocal PVCs, he was evaluated at [facility] in .  At that time a monitor revealed burden of 34%.  Carotid Doppler in  revealed nonobstructive bilateral carotid artery disease.  Stress echocardiogram revealed normal LV function with no significant valvular disease or evidence of ischemia.  On 2022 patient had ablation for PVCs performed at [facility].    Patient reports that he has done well since the last assessment.  He has not noted any palpitations, PVCs, shortness of breath.  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  When he is not working on his farm he exercises by walking on a treadmill.  He is currently asymptomatic and denies chest pain, dyspnea, dyspnea with exertion, increased fatigue.    The following portions of the patient's history were reviewed and updated as appropriate: allergies, current medications, past family history, past medical history, past social history, past surgical history and problem list.      VITAL SIGNS     Visit Vitals  /88 (BP Location: Left arm, Patient Position: Sitting, Cuff Size: Adult)   Pulse 68   Ht 182.9 cm (72.01\")   Wt 98.9 kg (218 lb)   SpO2 98%   BMI 29.56 kg/m²         Wt Readings from Last 3 Encounters:   24 98.9 kg (218 lb)   24 97.5 kg (215 lb)   24 98.9 kg (218 lb)     Body mass index is 29.56 kg/m².      REVIEW OF SYSTEMS     Review of Systems   Constitutional: Negative for " chills, fever, weight gain and weight loss.   Cardiovascular:  Negative for leg swelling.   Respiratory:  Negative for cough, snoring and wheezing.    Hematologic/Lymphatic: Negative for bleeding problem. Does not bruise/bleed easily.   Skin:  Negative for color change.   Musculoskeletal:  Negative for falls, joint pain and myalgias.   Gastrointestinal:  Negative for melena.   Genitourinary:  Negative for hematuria.   Neurological:  Negative for excessive daytime sleepiness.   Psychiatric/Behavioral:  Negative for depression. The patient is not nervous/anxious.            PHYSICAL EXAMINATION     Constitutional:       Appearance: Normal appearance. Well-developed.   Eyes:      Conjunctiva/sclera: Conjunctivae normal.   Neck:      Vascular: No carotid bruit.   Pulmonary:      Effort: Pulmonary effort is normal.      Breath sounds: Normal breath sounds.   Cardiovascular:      Normal rate. Regular rhythm. Normal S1. Normal S2.       Murmurs: There is no murmur.      No gallop.  No click. No rub.   Edema:     Peripheral edema absent.   Musculoskeletal: Normal range of motion. Skin:     General: Skin is warm and dry.   Neurological:      Mental Status: Alert and oriented to person, place, and time.      GCS: GCS eye subscore is 4. GCS verbal subscore is 5. GCS motor subscore is 6.   Psychiatric:         Speech: Speech normal.         Behavior: Behavior normal.         Thought Content: Thought content normal.         Judgment: Judgment normal.           REVIEWED DATA       ECG 12 Lead    Date/Time: 11/26/2024 11:11 AM  Performed by: Devorah Blakely APRN    Authorized by: Devorah Blakely APRN  Comparison: compared with previous ECG from 10/5/2023  Rhythm: sinus rhythm  Rate: normal  BPM: 61  Conduction: conduction normal  ST Segments: ST segments normal  T Waves: T waves normal  QRS axis: normal    Clinical impression: normal ECG              Lipid Panel          2/13/2024    09:14 8/12/2024    09:31   Lipid Panel  "  Total Cholesterol 112  108    Triglycerides 155  153    HDL Cholesterol 41  42    VLDL Cholesterol 26  26    LDL Cholesterol  45  40    LDL/HDL Ratio 0.98  0.84        Lab Results   Component Value Date     08/12/2024     02/13/2024    K 4.0 08/12/2024    K 4.4 02/13/2024     08/12/2024     02/13/2024    CO2 26.7 08/12/2024    CO2 24.8 02/13/2024    BUN 23 08/12/2024    BUN 24 (H) 02/13/2024    CREATININE 1.54 (H) 08/12/2024    CREATININE 1.80 (H) 02/13/2024    EGFRIFAFRI 57 (L) 01/31/2022    GLUCOSE 89 08/12/2024    GLUCOSE 86 02/13/2024    CALCIUM 8.9 08/12/2024    CALCIUM 9.7 02/13/2024    PROTENTOTREF 6.1 08/12/2024    PROTENTOTREF 6.4 02/13/2024    ALBUMIN 4.3 08/12/2024    ALBUMIN 4.7 02/13/2024    BILITOT 0.7 08/12/2024    BILITOT 0.7 02/13/2024    AST 16 08/12/2024    AST 18 02/13/2024    ALT 18 08/12/2024    ALT 19 02/13/2024     Lab Results   Component Value Date    WBC 8.0 05/27/2022    HGB 17.2 05/27/2022    HCT 50.4 05/27/2022    MCV 92 05/27/2022     05/27/2022     No results found for: \"PROBNP\", \"BNP\"  No results found for: \"CKTOTAL\", \"CKMB\", \"CKMBINDEX\", \"TROPONINI\", \"TROPONINT\"  No results found for: \"TSH\"          ASSESSMENT & PLAN     Diagnoses and all orders for this visit:    1. PVC (premature ventricular contraction) (Primary)  Assessment & Plan:  S/p ablation at the [facility] in 2022  No recurrence of palpitations  EG reveals normal sinus rhythm with no PVCs      2. Carotid stenosis, asymptomatic, left  Assessment & Plan:  Nonobstructive on carotid duplex in 2022 at [place]  Due for surveillance carotid duplex    Orders:  -     Duplex Carotid Ultrasound CAR; Future    3. Carotid stenosis, asymptomatic, right  Assessment & Plan:  Nonobstructive on carotid duplex in 2022 at [place]  Due for surveillance carotid duplex    Orders:  -     Duplex Carotid Ultrasound CAR; Future    4. Primary hypertension  Assessment & Plan:  BP controlled at " 120/88  Continue enalapril 20 mg twice daily      5. Mixed hyperlipidemia  Assessment & Plan:  Continue rosuvastatin      Other orders  -     ECG 12 Lead        Return in about 1 year (around 11/26/2025) for Dr. Rowell-follow up at [place].    Future Appointments         Provider Department Center    2/14/2025 9:00 AM [facility] [address] INTERNAL MEDICINE [place]    2/21/2025 10:30 AM Birgit Adam MD [facility] INTERNAL MEDICINE [place]    12/4/2025 11:00 AM Evi Rowell MD [facility] CARDIOLOGY [place]              MEDICATIONS         Discharge Medications            Accurate as of November 26, 2024 12:47 PM. If you have any questions, ask your nurse or doctor.                Changes to Medications        Instructions Start Date   rosuvastatin 10 MG tablet  Commonly known as: CRESTOR  What changed: Another medication with the same name was removed. Continue taking this medication, and follow the directions you see here.  Changed by: Morena Blakely APRN   10 mg, Oral, Daily             Continue These Medications        Instructions Start Date   allopurinol 300 MG tablet  Commonly known as: ZYLOPRIM   300 mg, Oral, Daily      Coenzyme Q10 10 MG capsule   Daily      enalapril 20 MG tablet  Commonly known as: VASOTEC   20 mg, Oral, 2 Times Daily      finasteride 5 MG tablet  Commonly known as: PROSCAR   No dose, route, or frequency recorded.      multivitamin with minerals tablet tablet   1 tablet, Every Other Day                   **Dragon Disclaimer:   Much of this encounter note is an electronic transcription/translation of spoken language to printed text. The electronic translation of spoken language may permit erroneous, or at times, nonsensical words or phrases to be inadvertently transcribed. Although I have reviewed the note for such errors, some may still exist.

## 2025-01-23 RX ORDER — ENALAPRIL MALEATE 20 MG/1
20 TABLET ORAL 2 TIMES DAILY
Qty: 180 TABLET | Refills: 0 | Status: SHIPPED | OUTPATIENT
Start: 2025-01-23

## 2025-02-21 ENCOUNTER — OFFICE VISIT (OUTPATIENT)
Dept: INTERNAL MEDICINE | Facility: CLINIC | Age: 75
End: 2025-02-21
Payer: MEDICARE

## 2025-02-21 VITALS
BODY MASS INDEX: 28.44 KG/M2 | DIASTOLIC BLOOD PRESSURE: 60 MMHG | WEIGHT: 210 LBS | HEART RATE: 56 BPM | OXYGEN SATURATION: 97 % | HEIGHT: 72 IN | SYSTOLIC BLOOD PRESSURE: 100 MMHG

## 2025-02-21 DIAGNOSIS — I10 ESSENTIAL HYPERTENSION: ICD-10-CM

## 2025-02-21 DIAGNOSIS — M1A.0790 CHRONIC IDIOPATHIC GOUT OF FOOT AND ANKLE REGION WITHOUT TOPHUS: ICD-10-CM

## 2025-02-21 DIAGNOSIS — E78.2 MIXED HYPERLIPIDEMIA: Primary | ICD-10-CM

## 2025-02-21 RX ORDER — ROSUVASTATIN CALCIUM 10 MG/1
10 TABLET, COATED ORAL DAILY
Qty: 90 TABLET | Refills: 1 | Status: SHIPPED | OUTPATIENT
Start: 2025-02-21

## 2025-02-21 NOTE — PROGRESS NOTES
Subjective   Marcus Sandoval is a 74 y.o. male.   Chief Complaint   Patient presents with    Hyperlipidemia    Hypertension    Gout       History of Present Illness     1.HTN- Patient was evaluated at the [facility] in July 2022 for bradycardia and frequent PVCs.  He had extensive work-up done.  He was treated with ablation.  It was successful.  He felt much better after it was done.    He was diagnosed with sleep apnea.  He uses CPAP every night.  It helps.     No lightheadedness, no chest pain, no shortness of breath, no palpitations.    He is on enalapril 10 mg twice a day (he takes half tablet of 20 mg twice a day).  He takes it every day.  He has no side effects from.  He continues to follow-up with nephrologist on chronic kidney disease. Creatinine 1.56 from 1.54 from 1.8 , GFR 45.7 from 46.5 from 38.8.  He avoids NSAIDs.  No blood seen in urine.  He exercises regularly.  3-5 times a week for an hour, he does cardio and strength  He lost 8 pounds on our scale.     2.  Hyperlipidemia-on Crestor at 10 mg a day.  He takes it every day.  No side effects.  LDL 47 from 40 from 45 from 41 from 48 from 51, HDL 44.  Triglycerides 205.  LFTs normal.     3. Gout- he followed up with Dr. Arana. He is on allopurinol 300 mg a day and colchicine. Symptoms are controlled.  Dr. Arana advised him to follow-up with us as symptoms are controlled.  No change from last office visit. Uric acid 4.2.    Review of Systems   Respiratory:  Negative for shortness of breath.    Cardiovascular:  Negative for chest pain and palpitations.   Neurological:  Negative for light-headedness.         Objective   Wt Readings from Last 3 Encounters:   02/21/25 95.3 kg (210 lb)   11/26/24 98.9 kg (218 lb)   08/20/24 97.5 kg (215 lb)      Vitals:    02/21/25 1041   BP: 100/60   Pulse: 56   SpO2: 97%     Temp Readings from Last 3 Encounters:   08/20/24 98.4 °F (36.9 °C)   02/20/24 97.1 °F (36.2 °C)   08/07/23 98 °F (36.7 °C)     BP Readings from  Last 3 Encounters:   02/21/25 100/60   11/26/24 120/88   08/20/24 130/70     Pulse Readings from Last 3 Encounters:   02/21/25 56   11/26/24 68   08/20/24 78     Body mass index is 28.47 kg/m².    Physical Exam  Constitutional:       Appearance: He is well-developed.   Neck:      Thyroid: No thyromegaly.      Vascular: No carotid bruit.   Cardiovascular:      Rate and Rhythm: Normal rate and regular rhythm.      Heart sounds: Normal heart sounds.   Pulmonary:      Effort: Pulmonary effort is normal.      Breath sounds: Normal breath sounds.   Skin:     General: Skin is warm and dry.   Neurological:      Mental Status: He is alert.   Psychiatric:         Behavior: Behavior normal.         Assessment & Plan   Diagnoses and all orders for this visit:    1. Mixed hyperlipidemia (Primary)    2. Essential hypertension    3. 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  Dispense: 90 tablet; Refill: 1        Hypertension-continue current treatment.  Good job with regular exercise and weight loss.  Follow-up in 6 months.    Hyperlipidemia-continue current treatment.  Limit carbs to help triglycerides.  Follow-up in 6 months.    Gout-continue current treatment.  Follow-up in 6 months

## 2025-07-28 RX ORDER — ALLOPURINOL 300 MG/1
300 TABLET ORAL DAILY
Qty: 90 TABLET | Refills: 3 | Status: SHIPPED | OUTPATIENT
Start: 2025-07-28

## 2025-08-15 DIAGNOSIS — E78.2 MIXED HYPERLIPIDEMIA: Primary | ICD-10-CM

## 2025-08-15 DIAGNOSIS — M1A.0790 CHRONIC IDIOPATHIC GOUT OF FOOT AND ANKLE REGION WITHOUT TOPHUS: ICD-10-CM

## 2025-08-15 LAB
ALBUMIN SERPL-MCNC: 4.6 G/DL (ref 3.5–5.2)
ALBUMIN/GLOB SERPL: 2.2 G/DL
ALP SERPL-CCNC: 83 U/L (ref 39–117)
ALT SERPL-CCNC: 18 U/L (ref 1–41)
AST SERPL-CCNC: 19 U/L (ref 1–40)
BILIRUB SERPL-MCNC: 0.7 MG/DL (ref 0–1.2)
BUN SERPL-MCNC: 24 MG/DL (ref 8–23)
BUN/CREAT SERPL: 14.2 (ref 7–25)
CALCIUM SERPL-MCNC: 9.7 MG/DL (ref 8.6–10.5)
CHLORIDE SERPL-SCNC: 103 MMOL/L (ref 98–107)
CHOLEST SERPL-MCNC: 116 MG/DL (ref 0–200)
CO2 SERPL-SCNC: 24.8 MMOL/L (ref 22–29)
CREAT SERPL-MCNC: 1.69 MG/DL (ref 0.76–1.27)
EGFRCR SERPLBLD CKD-EPI 2021: 41.3 ML/MIN/1.73
GLOBULIN SER CALC-MCNC: 2.1 GM/DL
GLUCOSE SERPL-MCNC: 86 MG/DL (ref 65–99)
HDLC SERPL-MCNC: 42 MG/DL (ref 40–60)
LDLC SERPL CALC-MCNC: 46 MG/DL (ref 0–100)
LDLC/HDLC SERPL: 0.95 {RATIO}
POTASSIUM SERPL-SCNC: 4.2 MMOL/L (ref 3.5–5.2)
PROT SERPL-MCNC: 6.7 G/DL (ref 6–8.5)
SODIUM SERPL-SCNC: 139 MMOL/L (ref 136–145)
TRIGL SERPL-MCNC: 171 MG/DL (ref 0–150)
URATE SERPL-MCNC: 4 MG/DL (ref 3.4–7)
VLDLC SERPL CALC-MCNC: 28 MG/DL (ref 5–40)

## 2025-08-22 ENCOUNTER — OFFICE VISIT (OUTPATIENT)
Dept: INTERNAL MEDICINE | Facility: CLINIC | Age: 75
End: 2025-08-22
Payer: MEDICARE

## 2025-08-22 VITALS
HEART RATE: 71 BPM | BODY MASS INDEX: 28.31 KG/M2 | TEMPERATURE: 97.8 F | WEIGHT: 209 LBS | SYSTOLIC BLOOD PRESSURE: 110 MMHG | DIASTOLIC BLOOD PRESSURE: 80 MMHG | OXYGEN SATURATION: 96 % | HEIGHT: 72 IN

## 2025-08-22 DIAGNOSIS — I10 ESSENTIAL HYPERTENSION: Primary | ICD-10-CM

## 2025-08-22 DIAGNOSIS — M70.22 OLECRANON BURSITIS OF LEFT ELBOW: ICD-10-CM

## 2025-08-22 DIAGNOSIS — M1A.0790 CHRONIC IDIOPATHIC GOUT OF FOOT AND ANKLE REGION WITHOUT TOPHUS: ICD-10-CM

## 2025-08-22 DIAGNOSIS — E78.2 MIXED HYPERLIPIDEMIA: ICD-10-CM

## 2025-08-22 PROBLEM — C43.59 MELANOMA OF BACK: Status: ACTIVE | Noted: 2025-08-22

## 2025-08-22 RX ORDER — ENALAPRIL MALEATE 20 MG/1
20 TABLET ORAL 2 TIMES DAILY
Qty: 180 TABLET | Refills: 1 | Status: SHIPPED | OUTPATIENT
Start: 2025-08-22

## 2025-08-22 RX ORDER — ROSUVASTATIN CALCIUM 10 MG/1
10 TABLET, COATED ORAL DAILY
Qty: 90 TABLET | Refills: 1 | Status: SHIPPED | OUTPATIENT
Start: 2025-08-22